# Patient Record
Sex: FEMALE | Race: WHITE | Employment: OTHER | URBAN - METROPOLITAN AREA
[De-identification: names, ages, dates, MRNs, and addresses within clinical notes are randomized per-mention and may not be internally consistent; named-entity substitution may affect disease eponyms.]

---

## 2024-03-06 RX ORDER — ALBUTEROL SULFATE 90 UG/1
2 AEROSOL, METERED RESPIRATORY (INHALATION) EVERY 4 HOURS PRN
COMMUNITY

## 2024-03-06 RX ORDER — AMOXICILLIN 500 MG/1
4 CAPSULE ORAL ONCE
COMMUNITY

## 2024-03-06 RX ORDER — MULTIVIT WITH MINERALS/LUTEIN
1000 TABLET ORAL DAILY
COMMUNITY

## 2024-03-06 RX ORDER — MULTIVITAMIN
1 TABLET ORAL DAILY
COMMUNITY

## 2024-03-06 RX ORDER — ASPIRIN 81 MG/1
81 TABLET, CHEWABLE ORAL DAILY
Status: ON HOLD | COMMUNITY
End: 2024-03-08

## 2024-03-06 RX ORDER — CYANOCOBALAMIN 1000 UG/ML
1 INJECTION, SOLUTION INTRAMUSCULAR; SUBCUTANEOUS
COMMUNITY

## 2024-03-06 RX ORDER — FLUOXETINE 40 MG/1
40 CAPSULE ORAL EVERY MORNING
COMMUNITY

## 2024-03-06 RX ORDER — BACLOFEN 10 MG/1
10 TABLET ORAL 2 TIMES DAILY
COMMUNITY

## 2024-03-06 RX ORDER — ATORVASTATIN CALCIUM 40 MG/1
40 TABLET, FILM COATED ORAL DAILY
COMMUNITY

## 2024-03-06 RX ORDER — NAPROXEN 500 MG/1
250 TABLET ORAL 2 TIMES DAILY WITH MEALS
COMMUNITY

## 2024-03-06 RX ORDER — CETIRIZINE HYDROCHLORIDE 10 MG/1
10 TABLET ORAL DAILY
COMMUNITY

## 2024-03-06 RX ORDER — DIPHENHYDRAMINE HCL 25 MG
25 CAPSULE ORAL
COMMUNITY

## 2024-03-06 RX ORDER — NITROGLYCERIN 0.4 MG/1
0.4 TABLET SUBLINGUAL EVERY 5 MIN PRN
COMMUNITY

## 2024-03-06 RX ORDER — CYCLOBENZAPRINE HCL 10 MG
10 TABLET ORAL
COMMUNITY

## 2024-03-06 RX ORDER — MONTELUKAST SODIUM 10 MG/1
10 TABLET ORAL AT BEDTIME
COMMUNITY

## 2024-03-06 RX ORDER — FERROUS SULFATE 324(65)MG
1 TABLET, DELAYED RELEASE (ENTERIC COATED) ORAL DAILY
COMMUNITY

## 2024-03-06 RX ORDER — HYDROCHLOROTHIAZIDE 25 MG/1
25 TABLET ORAL DAILY
COMMUNITY

## 2024-03-06 RX ORDER — AZELASTINE 1 MG/ML
1 SPRAY, METERED NASAL 2 TIMES DAILY
COMMUNITY

## 2024-03-06 RX ORDER — VITAMIN B COMPLEX
1 TABLET ORAL DAILY
COMMUNITY

## 2024-03-06 RX ORDER — EZETIMIBE 10 MG/1
10 TABLET ORAL DAILY
COMMUNITY

## 2024-03-06 RX ORDER — ACETAMINOPHEN 500 MG
500 TABLET ORAL AT BEDTIME
COMMUNITY

## 2024-03-06 RX ORDER — LOPERAMIDE HCL 2 MG
2 CAPSULE ORAL 4 TIMES DAILY PRN
COMMUNITY

## 2024-03-06 RX ORDER — OXYMETAZOLINE HYDROCHLORIDE 0.05 G/100ML
2 SPRAY NASAL 2 TIMES DAILY
COMMUNITY

## 2024-03-06 RX ORDER — TRAMADOL HYDROCHLORIDE 50 MG/1
50 TABLET ORAL EVERY 6 HOURS PRN
Status: ON HOLD | COMMUNITY
End: 2024-03-08

## 2024-03-06 RX ORDER — CALCIUM POLYCARBOPHIL 625 MG 625 MG/1
2 TABLET ORAL
COMMUNITY

## 2024-03-06 RX ORDER — OMEPRAZOLE 40 MG/1
40 CAPSULE, DELAYED RELEASE ORAL DAILY
COMMUNITY

## 2024-03-06 NOTE — PROGRESS NOTES
PTA medications updated by Medication Scribe prior to surgery via phone call with patient (last doses completed by Nurse)     Medication history sources: Patient and H&P  In the past week, patient estimated taking medication this percent of the time: Greater than 90%      Significant changes made to the medication list:  Patient reports no longer taking the following meds (med scribe removed from PTA med list): nasacort      Additional medication history information:   Patient was advised to bring: ventolin,astelin,nitrostat ,afrin    Medication reconciliation completed by provider prior to medication history? No    Time spent in this activity: 1.5 hours    The information provided in this note is only as accurate as the sources available at the time of update(s)      Prior to Admission medications    Medication Sig Last Dose Taking? Auth Provider Long Term End Date   acetaminophen (TYLENOL) 500 MG tablet Take 500 mg by mouth at bedtime Max dose 4000mg in 24 hours 03/07/2024 at am Yes Reported, Patient     albuterol (PROAIR HFA/PROVENTIL HFA/VENTOLIN HFA) 108 (90 Base) MCG/ACT inhaler Inhale 2 puffs into the lungs every 4 hours as needed for shortness of breath, wheezing or cough prn Yes Reported, Patient Yes    amoxicillin (AMOXIL) 500 MG capsule Take 4 capsules by mouth once Once 1 hour before dental procedure. prn Yes Reported, Patient     aspirin (ASA) 81 MG chewable tablet Take 81 mg by mouth daily 02/28/2024 at pm Yes Reported, Patient     atorvastatin (LIPITOR) 40 MG tablet Take 40 mg by mouth daily 03/06/2024 at pm Yes Reported, Patient Yes    azelastine (ASTELIN) 0.1 % nasal spray Spray 1 spray into both nostrils 2 times daily 03/07/2024 at am Yes Reported, Patient     baclofen (LIORESAL) 10 MG tablet Take 10 mg by mouth 2 times daily Prn muscle spasms 03/06/2024 at pm Yes Reported, Patient     calcium carbonate (OS-CARL) 1500 (600 Ca) MG tablet Take 1 tablet by mouth daily 02/28/2024 at pm Yes Reported,  Patient     calcium polycarbophil (FIBERCON) 625 MG tablet Take 2 tablets by mouth 02/28/2024 at pm Yes Reported, Patient     cetirizine (ZYRTEC) 10 MG tablet Take 10 mg by mouth daily 03/06/2024 at am Yes Reported, Patient     cyanocobalamin (CYANOCOBALAMIN) 1000 MCG/ML injection Inject 1 mL into the muscle every 30 days 03/06/2024 at pm Yes Reported, Patient     cyclobenzaprine (FLEXERIL) 10 MG tablet Take 10 mg by mouth nightly as needed for muscle spasms 03/06/2024 at pm Yes Reported, Patient     diphenhydrAMINE (BENADRYL) 25 MG capsule Take 25 mg by mouth nightly as needed for itching or allergies 03/06/2024 at pm Yes Reported, Patient     ezetimibe (ZETIA) 10 MG tablet Take 10 mg by mouth daily 03/06/2024 at pm Yes Reported, Patient Yes    Ferrous Sulfate 324 (65 Fe) MG TBEC Take 1 tablet by mouth daily 02/28//2024 at pm Yes Reported, Patient     FLUoxetine (PROZAC) 40 MG capsule Take 40 mg by mouth every morning 03/06/2024 at am Yes Reported, Patient Yes    hydrochlorothiazide (HYDRODIURIL) 25 MG tablet Take 25 mg by mouth daily prn Yes Reported, Patient Yes    loperamide (IMODIUM) 2 MG capsule Take 2 mg by mouth 4 times daily as needed for diarrhea 03/07/2024 at am Yes Reported, Patient     montelukast (SINGULAIR) 10 MG tablet Take 10 mg by mouth at bedtime 03/06/2024 at pm Yes Reported, Patient Yes    multiple vitamin  tablet TABS Take 1 tablet by mouth daily 02/28/2024 at pm Yes Reported, Patient     naproxen (NAPROSYN) 500 MG tablet Take 250 mg by mouth 2 times daily (with meals) 0.5 X 500mg = 250mg 02/28/2024 at pm Yes Reported, Patient     nitroGLYcerin (NITROSTAT) 0.4 MG sublingual tablet Place 0.4 mg under the tongue every 5 minutes as needed for chest pain For chest pain place 1 tablet under the tongue every 5 minutes for 3 doses. If symptoms persist 5 minutes after 1st dose call 911. prn Yes Reported, Patient Yes    NONFORMULARY Protein powder   minx in liquid daily 03/05/2024 at am Yes Reported,  Patient     omeprazole (PRILOSEC) 40 MG DR capsule Take 40 mg by mouth daily 03/07/2024 at am Yes Reported, Patient     oxymetazoline (AFRIN) 0.05 % nasal spray Spray 2 sprays into both nostrils 2 times daily prn Yes Reported, Patient     selenium 50 MCG TABS tablet Take 50 mcg by mouth daily 02/28/2024 at pm Yes Reported, Patient     traMADol (ULTRAM) 50 MG tablet Take 50 mg by mouth every 6 hours as needed for severe pain 03/04/2024 at pm Yes Reported, Patient     vitamin B complex with vitamin C (VITAMIN  B COMPLEX) tablet Take 1 tablet by mouth daily 02/28/2024 at pm Yes Reported, Patient     vitamin C (ASCORBIC ACID) 1000 MG TABS Take 1,000 mg by mouth daily 02/28/2024 at pm Yes Reported, Patient     Vitamin D3 (VITAMIN D, CHOLECALCIFEROL,) 25 mcg (1000 units) tablet Take 1 tablet by mouth daily 02/28/2024 at pm Yes Reported, Patient         Medication history completed by: Makayla Garza

## 2024-03-07 ENCOUNTER — HOSPITAL ENCOUNTER (INPATIENT)
Facility: CLINIC | Age: 79
LOS: 2 days | Discharge: HOME | DRG: 467 | End: 2024-03-09
Attending: ORTHOPAEDIC SURGERY | Admitting: ORTHOPAEDIC SURGERY
Payer: COMMERCIAL

## 2024-03-07 ENCOUNTER — APPOINTMENT (OUTPATIENT)
Dept: PHYSICAL THERAPY | Facility: CLINIC | Age: 79
DRG: 467 | End: 2024-03-07
Attending: ORTHOPAEDIC SURGERY
Payer: COMMERCIAL

## 2024-03-07 ENCOUNTER — ANESTHESIA EVENT (OUTPATIENT)
Dept: SURGERY | Facility: CLINIC | Age: 79
DRG: 467 | End: 2024-03-07
Payer: COMMERCIAL

## 2024-03-07 ENCOUNTER — ANESTHESIA (OUTPATIENT)
Dept: SURGERY | Facility: CLINIC | Age: 79
DRG: 467 | End: 2024-03-07
Payer: COMMERCIAL

## 2024-03-07 ENCOUNTER — APPOINTMENT (OUTPATIENT)
Dept: GENERAL RADIOLOGY | Facility: CLINIC | Age: 79
DRG: 467 | End: 2024-03-07
Attending: ORTHOPAEDIC SURGERY
Payer: COMMERCIAL

## 2024-03-07 DIAGNOSIS — Z87.828 HEALED WOUND: ICD-10-CM

## 2024-03-07 DIAGNOSIS — Z96.652 S/P REVISION OF TOTAL KNEE, LEFT: ICD-10-CM

## 2024-03-07 DIAGNOSIS — Z96.652 STATUS POST REVISION OF TOTAL KNEE REPLACEMENT, LEFT: Primary | ICD-10-CM

## 2024-03-07 PROBLEM — E66.813 OBESITY, CLASS III, BMI 40-49.9 (MORBID OBESITY) (H): Status: ACTIVE | Noted: 2017-02-15

## 2024-03-07 PROBLEM — M47.816 OSTEOARTHRITIS OF LUMBAR SPINE: Status: ACTIVE | Noted: 2017-11-20

## 2024-03-07 PROBLEM — E55.9 VITAMIN D DEFICIENCY: Status: ACTIVE | Noted: 2017-08-22

## 2024-03-07 PROBLEM — E78.5 HYPERLIPIDEMIA, UNSPECIFIED: Status: ACTIVE | Noted: 2017-02-16

## 2024-03-07 PROBLEM — F32.1 DEPRESSION, MAJOR, SINGLE EPISODE, MODERATE (H): Status: ACTIVE | Noted: 2024-02-14

## 2024-03-07 PROBLEM — F32.0 DEPRESSION, MAJOR, SINGLE EPISODE, MILD (H): Status: ACTIVE | Noted: 2021-06-01

## 2024-03-07 PROBLEM — G47.33 OBSTRUCTIVE SLEEP APNEA: Status: ACTIVE | Noted: 2019-01-04

## 2024-03-07 PROBLEM — Z98.84 HISTORY OF BARIATRIC SURGERY: Status: ACTIVE | Noted: 2024-02-14

## 2024-03-07 PROBLEM — E66.01 OBESITY, CLASS III, BMI 40-49.9 (MORBID OBESITY) (H): Status: ACTIVE | Noted: 2017-02-15

## 2024-03-07 PROBLEM — C44.90 MALIGNANT NEOPLASM OF SKIN: Status: ACTIVE | Noted: 2021-10-13

## 2024-03-07 PROBLEM — E03.9 HYPOTHYROIDISM: Status: ACTIVE | Noted: 2017-08-22

## 2024-03-07 PROBLEM — C44.301 SKIN CANCER OF NOSE: Status: ACTIVE | Noted: 2022-03-08

## 2024-03-07 LAB
ABO/RH(D): NORMAL
ANTIBODY SCREEN: NEGATIVE
CREAT SERPL-MCNC: 0.77 MG/DL (ref 0.51–0.95)
EGFRCR SERPLBLD CKD-EPI 2021: 79 ML/MIN/1.73M2
GLUCOSE SERPL-MCNC: 98 MG/DL (ref 70–99)
GRAM STAIN RESULT: NORMAL
GRAM STAIN RESULT: NORMAL
HGB BLD-MCNC: 11.1 G/DL (ref 11.7–15.7)
POTASSIUM SERPL-SCNC: 4.5 MMOL/L (ref 3.4–5.3)
SPECIMEN EXPIRATION DATE: NORMAL

## 2024-03-07 PROCEDURE — 271N000001 HC OR GENERAL SUPPLY NON-STERILE: Performed by: ORTHOPAEDIC SURGERY

## 2024-03-07 PROCEDURE — 370N000017 HC ANESTHESIA TECHNICAL FEE, PER MIN: Performed by: ORTHOPAEDIC SURGERY

## 2024-03-07 PROCEDURE — 250N000013 HC RX MED GY IP 250 OP 250 PS 637: Performed by: ORTHOPAEDIC SURGERY

## 2024-03-07 PROCEDURE — 250N000011 HC RX IP 250 OP 636: Performed by: PHYSICIAN ASSISTANT

## 2024-03-07 PROCEDURE — 250N000011 HC RX IP 250 OP 636: Performed by: ORTHOPAEDIC SURGERY

## 2024-03-07 PROCEDURE — 120N000001 HC R&B MED SURG/OB

## 2024-03-07 PROCEDURE — 258N000003 HC RX IP 258 OP 636: Performed by: ANESTHESIOLOGY

## 2024-03-07 PROCEDURE — C1713 ANCHOR/SCREW BN/BN,TIS/BN: HCPCS | Performed by: ORTHOPAEDIC SURGERY

## 2024-03-07 PROCEDURE — 250N000025 HC SEVOFLURANE, PER MIN: Performed by: ORTHOPAEDIC SURGERY

## 2024-03-07 PROCEDURE — 250N000011 HC RX IP 250 OP 636: Performed by: ANESTHESIOLOGY

## 2024-03-07 PROCEDURE — 27486 REVISE/REPLACE KNEE JOINT: CPT | Performed by: NURSE ANESTHETIST, CERTIFIED REGISTERED

## 2024-03-07 PROCEDURE — 36415 COLL VENOUS BLD VENIPUNCTURE: CPT | Performed by: ANESTHESIOLOGY

## 2024-03-07 PROCEDURE — 710N000009 HC RECOVERY PHASE 1, LEVEL 1, PER MIN: Performed by: ORTHOPAEDIC SURGERY

## 2024-03-07 PROCEDURE — 272N000001 HC OR GENERAL SUPPLY STERILE: Performed by: ORTHOPAEDIC SURGERY

## 2024-03-07 PROCEDURE — 97161 PT EVAL LOW COMPLEX 20 MIN: CPT | Mod: GP

## 2024-03-07 PROCEDURE — 258N000003 HC RX IP 258 OP 636: Performed by: ORTHOPAEDIC SURGERY

## 2024-03-07 PROCEDURE — 250N000011 HC RX IP 250 OP 636: Performed by: NURSE ANESTHETIST, CERTIFIED REGISTERED

## 2024-03-07 PROCEDURE — 258N000003 HC RX IP 258 OP 636: Performed by: NURSE ANESTHETIST, CERTIFIED REGISTERED

## 2024-03-07 PROCEDURE — 87205 SMEAR GRAM STAIN: CPT | Performed by: ORTHOPAEDIC SURGERY

## 2024-03-07 PROCEDURE — 82565 ASSAY OF CREATININE: CPT | Performed by: ANESTHESIOLOGY

## 2024-03-07 PROCEDURE — 250N000011 HC RX IP 250 OP 636: Mod: JZ | Performed by: NURSE ANESTHETIST, CERTIFIED REGISTERED

## 2024-03-07 PROCEDURE — 82947 ASSAY GLUCOSE BLOOD QUANT: CPT | Performed by: ANESTHESIOLOGY

## 2024-03-07 PROCEDURE — 250N000009 HC RX 250: Performed by: NURSE ANESTHETIST, CERTIFIED REGISTERED

## 2024-03-07 PROCEDURE — 99100 ANES PT EXTEME AGE<1 YR&>70: CPT | Performed by: NURSE ANESTHETIST, CERTIFIED REGISTERED

## 2024-03-07 PROCEDURE — C1762 CONN TISS, HUMAN(INC FASCIA): HCPCS | Performed by: ORTHOPAEDIC SURGERY

## 2024-03-07 PROCEDURE — 250N000013 HC RX MED GY IP 250 OP 250 PS 637: Performed by: PHYSICIAN ASSISTANT

## 2024-03-07 PROCEDURE — 999N000063 XR KNEE PORT LEFT 1/2 VIEWS: Mod: LT

## 2024-03-07 PROCEDURE — 999N000141 HC STATISTIC PRE-PROCEDURE NURSING ASSESSMENT: Performed by: ORTHOPAEDIC SURGERY

## 2024-03-07 PROCEDURE — 27486 REVISE/REPLACE KNEE JOINT: CPT | Performed by: ANESTHESIOLOGY

## 2024-03-07 PROCEDURE — 84132 ASSAY OF SERUM POTASSIUM: CPT | Performed by: ANESTHESIOLOGY

## 2024-03-07 PROCEDURE — 86900 BLOOD TYPING SEROLOGIC ABO: CPT | Performed by: ORTHOPAEDIC SURGERY

## 2024-03-07 PROCEDURE — 87070 CULTURE OTHR SPECIMN AEROBIC: CPT | Performed by: ORTHOPAEDIC SURGERY

## 2024-03-07 PROCEDURE — 360N000078 HC SURGERY LEVEL 5, PER MIN: Performed by: ORTHOPAEDIC SURGERY

## 2024-03-07 PROCEDURE — 250N000009 HC RX 250: Performed by: ORTHOPAEDIC SURGERY

## 2024-03-07 PROCEDURE — P9045 ALBUMIN (HUMAN), 5%, 250 ML: HCPCS | Mod: JZ | Performed by: NURSE ANESTHETIST, CERTIFIED REGISTERED

## 2024-03-07 PROCEDURE — C1776 JOINT DEVICE (IMPLANTABLE): HCPCS | Performed by: ORTHOPAEDIC SURGERY

## 2024-03-07 PROCEDURE — 97110 THERAPEUTIC EXERCISES: CPT | Mod: GP

## 2024-03-07 PROCEDURE — 85018 HEMOGLOBIN: CPT | Performed by: ORTHOPAEDIC SURGERY

## 2024-03-07 PROCEDURE — 87076 CULTURE ANAEROBE IDENT EACH: CPT | Performed by: ORTHOPAEDIC SURGERY

## 2024-03-07 PROCEDURE — 258N000003 HC RX IP 258 OP 636: Performed by: PHYSICIAN ASSISTANT

## 2024-03-07 DEVICE — LPS DISTAL FEMORAL COMPONENT X-SMALL LEFT
Type: IMPLANTABLE DEVICE | Site: KNEE | Status: FUNCTIONAL
Brand: LPS

## 2024-03-07 DEVICE — BONE CEMENT SIMPLEX W/TOBRAMYCIN 6197-9-001: Type: IMPLANTABLE DEVICE | Site: KNEE | Status: FUNCTIONAL

## 2024-03-07 DEVICE — GRAFT BONE STRUT CORTICAL 10CMX20MM 400610: Type: IMPLANTABLE DEVICE | Site: KNEE | Status: FUNCTIONAL

## 2024-03-07 DEVICE — IMP CABLE SYN ORTHO COCR W/CRIMP 1.7X750MM 611.105.01S: Type: IMPLANTABLE DEVICE | Site: KNEE | Status: FUNCTIONAL

## 2024-03-07 DEVICE — DBM T44150 10CC ORTHOBLEND SMALL DEFGRAF
Type: IMPLANTABLE DEVICE | Site: KNEE | Status: FUNCTIONAL
Brand: GRAFTON®AND GRAFTON PLUS®DEMINERALIZED BONE MATRIX (DBM)

## 2024-03-07 DEVICE — BUCK FEMORAL CEMENT RESTRICTOR 18.5MM
Type: IMPLANTABLE DEVICE | Site: KNEE | Status: FUNCTIONAL
Brand: BUCK

## 2024-03-07 DEVICE — ATTUNE KNEE SYSTEM REVISION TIBIAL SLEEVE POROCOAT PARTIALLY COATED 45MM
Type: IMPLANTABLE DEVICE | Site: KNEE | Status: FUNCTIONAL
Brand: ATTUNE

## 2024-03-07 DEVICE — ATTUNE KNEE SYSTEM REVISION ROTATING PLATFORM TIBIAL BASE CEMENTED SIZE 6
Type: IMPLANTABLE DEVICE | Site: KNEE | Status: FUNCTIONAL
Brand: ATTUNE

## 2024-03-07 DEVICE — ATTUNE KNEE SYSTEM REVISION PRESSFIT STEM 20X60MM
Type: IMPLANTABLE DEVICE | Site: KNEE | Status: FUNCTIONAL
Brand: ATTUNE

## 2024-03-07 RX ORDER — LABETALOL HYDROCHLORIDE 5 MG/ML
5 INJECTION, SOLUTION INTRAVENOUS
Status: DISCONTINUED | OUTPATIENT
Start: 2024-03-07 | End: 2024-03-07 | Stop reason: HOSPADM

## 2024-03-07 RX ORDER — ALBUTEROL SULFATE 90 UG/1
2 AEROSOL, METERED RESPIRATORY (INHALATION) EVERY 4 HOURS PRN
Status: DISCONTINUED | OUTPATIENT
Start: 2024-03-07 | End: 2024-03-09 | Stop reason: HOSPADM

## 2024-03-07 RX ORDER — SCOLOPAMINE TRANSDERMAL SYSTEM 1 MG/1
1 PATCH, EXTENDED RELEASE TRANSDERMAL ONCE
Status: COMPLETED | OUTPATIENT
Start: 2024-03-07 | End: 2024-03-08

## 2024-03-07 RX ORDER — AZELASTINE 1 MG/ML
1 SPRAY, METERED NASAL 2 TIMES DAILY
Status: DISCONTINUED | OUTPATIENT
Start: 2024-03-07 | End: 2024-03-09 | Stop reason: HOSPADM

## 2024-03-07 RX ORDER — MONTELUKAST SODIUM 10 MG/1
10 TABLET ORAL AT BEDTIME
Status: DISCONTINUED | OUTPATIENT
Start: 2024-03-07 | End: 2024-03-09 | Stop reason: HOSPADM

## 2024-03-07 RX ORDER — HYDROCHLOROTHIAZIDE 25 MG/1
25 TABLET ORAL DAILY PRN
Status: DISCONTINUED | OUTPATIENT
Start: 2024-03-07 | End: 2024-03-09 | Stop reason: HOSPADM

## 2024-03-07 RX ORDER — OXYMETAZOLINE HYDROCHLORIDE 0.05 G/100ML
2 SPRAY NASAL 2 TIMES DAILY PRN
Status: DISCONTINUED | OUTPATIENT
Start: 2024-03-07 | End: 2024-03-09 | Stop reason: HOSPADM

## 2024-03-07 RX ORDER — VANCOMYCIN HYDROCHLORIDE 1 G/20ML
INJECTION, POWDER, LYOPHILIZED, FOR SOLUTION INTRAVENOUS PRN
Status: DISCONTINUED | OUTPATIENT
Start: 2024-03-07 | End: 2024-03-07 | Stop reason: HOSPADM

## 2024-03-07 RX ORDER — NALOXONE HYDROCHLORIDE 0.4 MG/ML
0.4 INJECTION, SOLUTION INTRAMUSCULAR; INTRAVENOUS; SUBCUTANEOUS
Status: DISCONTINUED | OUTPATIENT
Start: 2024-03-07 | End: 2024-03-09 | Stop reason: HOSPADM

## 2024-03-07 RX ORDER — HYDROXYZINE HYDROCHLORIDE 10 MG/1
10 TABLET, FILM COATED ORAL EVERY 6 HOURS PRN
Status: DISCONTINUED | OUTPATIENT
Start: 2024-03-07 | End: 2024-03-09 | Stop reason: HOSPADM

## 2024-03-07 RX ORDER — CEFAZOLIN SODIUM/WATER 2 G/20 ML
2 SYRINGE (ML) INTRAVENOUS
Status: COMPLETED | OUTPATIENT
Start: 2024-03-07 | End: 2024-03-07

## 2024-03-07 RX ORDER — ASCORBIC ACID 500 MG
1000 TABLET ORAL EVERY EVENING
Status: DISCONTINUED | OUTPATIENT
Start: 2024-03-07 | End: 2024-03-09 | Stop reason: HOSPADM

## 2024-03-07 RX ORDER — HYDRALAZINE HYDROCHLORIDE 20 MG/ML
2.5-5 INJECTION INTRAMUSCULAR; INTRAVENOUS
Status: DISCONTINUED | OUTPATIENT
Start: 2024-03-07 | End: 2024-03-07 | Stop reason: HOSPADM

## 2024-03-07 RX ORDER — FENTANYL CITRATE 50 UG/ML
50 INJECTION, SOLUTION INTRAMUSCULAR; INTRAVENOUS EVERY 5 MIN PRN
Status: DISCONTINUED | OUTPATIENT
Start: 2024-03-07 | End: 2024-03-07 | Stop reason: HOSPADM

## 2024-03-07 RX ORDER — FERROUS SULFATE 325(65) MG
324 TABLET ORAL EVERY EVENING
Status: DISCONTINUED | OUTPATIENT
Start: 2024-03-07 | End: 2024-03-07

## 2024-03-07 RX ORDER — TRAMADOL HYDROCHLORIDE 50 MG/1
50 TABLET ORAL EVERY 6 HOURS PRN
Status: DISCONTINUED | OUTPATIENT
Start: 2024-03-07 | End: 2024-03-09 | Stop reason: HOSPADM

## 2024-03-07 RX ORDER — ONDANSETRON 4 MG/1
4 TABLET, ORALLY DISINTEGRATING ORAL EVERY 30 MIN PRN
Status: DISCONTINUED | OUTPATIENT
Start: 2024-03-07 | End: 2024-03-07 | Stop reason: HOSPADM

## 2024-03-07 RX ORDER — NALOXONE HYDROCHLORIDE 0.4 MG/ML
0.2 INJECTION, SOLUTION INTRAMUSCULAR; INTRAVENOUS; SUBCUTANEOUS
Status: DISCONTINUED | OUTPATIENT
Start: 2024-03-07 | End: 2024-03-09 | Stop reason: HOSPADM

## 2024-03-07 RX ORDER — AMOXICILLIN 250 MG
1 CAPSULE ORAL 2 TIMES DAILY
Status: DISCONTINUED | OUTPATIENT
Start: 2024-03-07 | End: 2024-03-09 | Stop reason: HOSPADM

## 2024-03-07 RX ORDER — FENTANYL CITRATE 50 UG/ML
INJECTION, SOLUTION INTRAMUSCULAR; INTRAVENOUS PRN
Status: DISCONTINUED | OUTPATIENT
Start: 2024-03-07 | End: 2024-03-07

## 2024-03-07 RX ORDER — SODIUM CHLORIDE, SODIUM LACTATE, POTASSIUM CHLORIDE, CALCIUM CHLORIDE 600; 310; 30; 20 MG/100ML; MG/100ML; MG/100ML; MG/100ML
INJECTION, SOLUTION INTRAVENOUS CONTINUOUS
Status: DISCONTINUED | OUTPATIENT
Start: 2024-03-07 | End: 2024-03-07 | Stop reason: HOSPADM

## 2024-03-07 RX ORDER — MAGNESIUM HYDROXIDE 1200 MG/15ML
LIQUID ORAL PRN
Status: DISCONTINUED | OUTPATIENT
Start: 2024-03-07 | End: 2024-03-07 | Stop reason: HOSPADM

## 2024-03-07 RX ORDER — ACETAMINOPHEN 325 MG/1
975 TABLET ORAL 3 TIMES DAILY
Qty: 27 TABLET | Refills: 0 | Status: DISCONTINUED | OUTPATIENT
Start: 2024-03-07 | End: 2024-03-09 | Stop reason: HOSPADM

## 2024-03-07 RX ORDER — ONDANSETRON 2 MG/ML
4 INJECTION INTRAMUSCULAR; INTRAVENOUS EVERY 6 HOURS PRN
Status: DISCONTINUED | OUTPATIENT
Start: 2024-03-07 | End: 2024-03-09 | Stop reason: HOSPADM

## 2024-03-07 RX ORDER — LOPERAMIDE HCL 2 MG
2 CAPSULE ORAL 4 TIMES DAILY PRN
Status: DISCONTINUED | OUTPATIENT
Start: 2024-03-07 | End: 2024-03-09 | Stop reason: HOSPADM

## 2024-03-07 RX ORDER — ONDANSETRON 2 MG/ML
4 INJECTION INTRAMUSCULAR; INTRAVENOUS EVERY 30 MIN PRN
Status: DISCONTINUED | OUTPATIENT
Start: 2024-03-07 | End: 2024-03-07 | Stop reason: HOSPADM

## 2024-03-07 RX ORDER — CEFAZOLIN SODIUM/WATER 2 G/20 ML
2 SYRINGE (ML) INTRAVENOUS SEE ADMIN INSTRUCTIONS
Status: DISCONTINUED | OUTPATIENT
Start: 2024-03-07 | End: 2024-03-07 | Stop reason: HOSPADM

## 2024-03-07 RX ORDER — PANTOPRAZOLE SODIUM 40 MG/1
40 TABLET, DELAYED RELEASE ORAL DAILY
Status: DISCONTINUED | OUTPATIENT
Start: 2024-03-08 | End: 2024-03-09 | Stop reason: HOSPADM

## 2024-03-07 RX ORDER — TRANEXAMIC ACID 650 MG/1
1950 TABLET ORAL ONCE
Status: COMPLETED | OUTPATIENT
Start: 2024-03-07 | End: 2024-03-07

## 2024-03-07 RX ORDER — GABAPENTIN 100 MG/1
100 CAPSULE ORAL AT BEDTIME
Status: DISCONTINUED | OUTPATIENT
Start: 2024-03-07 | End: 2024-03-09 | Stop reason: HOSPADM

## 2024-03-07 RX ORDER — BISACODYL 10 MG
10 SUPPOSITORY, RECTAL RECTAL DAILY PRN
Status: DISCONTINUED | OUTPATIENT
Start: 2024-03-10 | End: 2024-03-09 | Stop reason: HOSPADM

## 2024-03-07 RX ORDER — VECURONIUM BROMIDE 1 MG/ML
INJECTION, POWDER, LYOPHILIZED, FOR SOLUTION INTRAVENOUS PRN
Status: DISCONTINUED | OUTPATIENT
Start: 2024-03-07 | End: 2024-03-07

## 2024-03-07 RX ORDER — SODIUM CHLORIDE, SODIUM LACTATE, POTASSIUM CHLORIDE, CALCIUM CHLORIDE 600; 310; 30; 20 MG/100ML; MG/100ML; MG/100ML; MG/100ML
INJECTION, SOLUTION INTRAVENOUS CONTINUOUS
Status: DISCONTINUED | OUTPATIENT
Start: 2024-03-07 | End: 2024-03-09 | Stop reason: HOSPADM

## 2024-03-07 RX ORDER — ONDANSETRON 4 MG/1
4 TABLET, ORALLY DISINTEGRATING ORAL EVERY 6 HOURS PRN
Status: DISCONTINUED | OUTPATIENT
Start: 2024-03-07 | End: 2024-03-09 | Stop reason: HOSPADM

## 2024-03-07 RX ORDER — NALOXONE HYDROCHLORIDE 0.4 MG/ML
0.1 INJECTION, SOLUTION INTRAMUSCULAR; INTRAVENOUS; SUBCUTANEOUS
Status: DISCONTINUED | OUTPATIENT
Start: 2024-03-07 | End: 2024-03-07 | Stop reason: HOSPADM

## 2024-03-07 RX ORDER — CYCLOBENZAPRINE HCL 10 MG
10 TABLET ORAL
Status: DISCONTINUED | OUTPATIENT
Start: 2024-03-07 | End: 2024-03-09 | Stop reason: HOSPADM

## 2024-03-07 RX ORDER — LIDOCAINE 40 MG/G
CREAM TOPICAL
Status: DISCONTINUED | OUTPATIENT
Start: 2024-03-07 | End: 2024-03-09 | Stop reason: HOSPADM

## 2024-03-07 RX ORDER — ATORVASTATIN CALCIUM 40 MG/1
40 TABLET, FILM COATED ORAL EVERY EVENING
Status: DISCONTINUED | OUTPATIENT
Start: 2024-03-07 | End: 2024-03-09 | Stop reason: HOSPADM

## 2024-03-07 RX ORDER — PROCHLORPERAZINE MALEATE 5 MG
5 TABLET ORAL EVERY 6 HOURS PRN
Status: DISCONTINUED | OUTPATIENT
Start: 2024-03-07 | End: 2024-03-09 | Stop reason: HOSPADM

## 2024-03-07 RX ORDER — PROPOFOL 10 MG/ML
INJECTION, EMULSION INTRAVENOUS CONTINUOUS PRN
Status: DISCONTINUED | OUTPATIENT
Start: 2024-03-07 | End: 2024-03-07

## 2024-03-07 RX ORDER — POLYETHYLENE GLYCOL 3350 17 G/17G
17 POWDER, FOR SOLUTION ORAL DAILY
Status: DISCONTINUED | OUTPATIENT
Start: 2024-03-08 | End: 2024-03-09 | Stop reason: HOSPADM

## 2024-03-07 RX ORDER — CETIRIZINE HYDROCHLORIDE 10 MG/1
10 TABLET ORAL DAILY
Status: DISCONTINUED | OUTPATIENT
Start: 2024-03-08 | End: 2024-03-09 | Stop reason: HOSPADM

## 2024-03-07 RX ORDER — KETAMINE HYDROCHLORIDE 10 MG/ML
INJECTION INTRAMUSCULAR; INTRAVENOUS PRN
Status: DISCONTINUED | OUTPATIENT
Start: 2024-03-07 | End: 2024-03-07

## 2024-03-07 RX ORDER — CALCIUM POLYCARBOPHIL 625 MG 625 MG/1
1250 TABLET ORAL EVERY EVENING
Status: DISCONTINUED | OUTPATIENT
Start: 2024-03-07 | End: 2024-03-09 | Stop reason: HOSPADM

## 2024-03-07 RX ORDER — DEXAMETHASONE SODIUM PHOSPHATE 4 MG/ML
INJECTION, SOLUTION INTRA-ARTICULAR; INTRALESIONAL; INTRAMUSCULAR; INTRAVENOUS; SOFT TISSUE PRN
Status: DISCONTINUED | OUTPATIENT
Start: 2024-03-07 | End: 2024-03-07

## 2024-03-07 RX ORDER — PROPOFOL 10 MG/ML
INJECTION, EMULSION INTRAVENOUS PRN
Status: DISCONTINUED | OUTPATIENT
Start: 2024-03-07 | End: 2024-03-07

## 2024-03-07 RX ORDER — LIDOCAINE HYDROCHLORIDE 20 MG/ML
INJECTION, SOLUTION INFILTRATION; PERINEURAL PRN
Status: DISCONTINUED | OUTPATIENT
Start: 2024-03-07 | End: 2024-03-07

## 2024-03-07 RX ORDER — BACLOFEN 10 MG/1
10 TABLET ORAL 2 TIMES DAILY
Status: DISCONTINUED | OUTPATIENT
Start: 2024-03-07 | End: 2024-03-09 | Stop reason: HOSPADM

## 2024-03-07 RX ORDER — EZETIMIBE 10 MG/1
10 TABLET ORAL EVERY EVENING
Status: DISCONTINUED | OUTPATIENT
Start: 2024-03-07 | End: 2024-03-09 | Stop reason: HOSPADM

## 2024-03-07 RX ORDER — DIPHENHYDRAMINE HCL 25 MG
25 CAPSULE ORAL
Status: DISCONTINUED | OUTPATIENT
Start: 2024-03-07 | End: 2024-03-09 | Stop reason: HOSPADM

## 2024-03-07 RX ORDER — ONDANSETRON 2 MG/ML
INJECTION INTRAMUSCULAR; INTRAVENOUS PRN
Status: DISCONTINUED | OUTPATIENT
Start: 2024-03-07 | End: 2024-03-07

## 2024-03-07 RX ORDER — ACETAMINOPHEN 325 MG/1
650 TABLET ORAL EVERY 4 HOURS PRN
Status: DISCONTINUED | OUTPATIENT
Start: 2024-03-10 | End: 2024-03-09 | Stop reason: HOSPADM

## 2024-03-07 RX ORDER — NITROGLYCERIN 0.4 MG/1
0.4 TABLET SUBLINGUAL EVERY 5 MIN PRN
Status: DISCONTINUED | OUTPATIENT
Start: 2024-03-07 | End: 2024-03-09 | Stop reason: HOSPADM

## 2024-03-07 RX ORDER — VITAMIN B COMPLEX
25 TABLET ORAL EVERY EVENING
Status: DISCONTINUED | OUTPATIENT
Start: 2024-03-07 | End: 2024-03-09 | Stop reason: HOSPADM

## 2024-03-07 RX ORDER — FENTANYL CITRATE 50 UG/ML
25 INJECTION, SOLUTION INTRAMUSCULAR; INTRAVENOUS EVERY 5 MIN PRN
Status: DISCONTINUED | OUTPATIENT
Start: 2024-03-07 | End: 2024-03-07 | Stop reason: HOSPADM

## 2024-03-07 RX ORDER — FERROUS SULFATE 325(65) MG
325 TABLET ORAL EVERY EVENING
Status: DISCONTINUED | OUTPATIENT
Start: 2024-03-07 | End: 2024-03-09 | Stop reason: HOSPADM

## 2024-03-07 RX ADMIN — DEXMEDETOMIDINE HYDROCHLORIDE 4 MCG: 100 INJECTION, SOLUTION INTRAVENOUS at 12:08

## 2024-03-07 RX ADMIN — OXYCODONE HYDROCHLORIDE AND ACETAMINOPHEN 1000 MG: 500 TABLET ORAL at 20:29

## 2024-03-07 RX ADMIN — DOCUSATE SODIUM 50 MG AND SENNOSIDES 8.6 MG 1 TABLET: 8.6; 5 TABLET, FILM COATED ORAL at 20:30

## 2024-03-07 RX ADMIN — B-COMPLEX W/ C & FOLIC ACID TAB 1 TABLET: TAB at 20:28

## 2024-03-07 RX ADMIN — Medication 10 MG: at 14:49

## 2024-03-07 RX ADMIN — Medication 20 MG: at 13:22

## 2024-03-07 RX ADMIN — ACETAMINOPHEN 975 MG: 325 TABLET, FILM COATED ORAL at 18:33

## 2024-03-07 RX ADMIN — Medication 2 G: at 09:59

## 2024-03-07 RX ADMIN — ALBUMIN HUMAN: 0.05 INJECTION, SOLUTION INTRAVENOUS at 14:12

## 2024-03-07 RX ADMIN — DEXMEDETOMIDINE HYDROCHLORIDE 20 MCG: 100 INJECTION, SOLUTION INTRAVENOUS at 15:36

## 2024-03-07 RX ADMIN — SUGAMMADEX 300 MG: 100 INJECTION, SOLUTION INTRAVENOUS at 16:04

## 2024-03-07 RX ADMIN — VECURONIUM BROMIDE 2 MG: 1 INJECTION, POWDER, LYOPHILIZED, FOR SOLUTION INTRAVENOUS at 13:50

## 2024-03-07 RX ADMIN — VECURONIUM BROMIDE 2 MG: 1 INJECTION, POWDER, LYOPHILIZED, FOR SOLUTION INTRAVENOUS at 12:23

## 2024-03-07 RX ADMIN — FENTANYL CITRATE 50 MCG: 50 INJECTION, SOLUTION INTRAMUSCULAR; INTRAVENOUS at 17:27

## 2024-03-07 RX ADMIN — LIDOCAINE HYDROCHLORIDE 100 MG: 20 INJECTION, SOLUTION INFILTRATION; PERINEURAL at 09:55

## 2024-03-07 RX ADMIN — Medication 2 G: at 12:27

## 2024-03-07 RX ADMIN — MONTELUKAST 10 MG: 10 TABLET, FILM COATED ORAL at 22:22

## 2024-03-07 RX ADMIN — VECURONIUM BROMIDE 2 MG: 1 INJECTION, POWDER, LYOPHILIZED, FOR SOLUTION INTRAVENOUS at 13:21

## 2024-03-07 RX ADMIN — ATORVASTATIN CALCIUM 40 MG: 40 TABLET, FILM COATED ORAL at 20:39

## 2024-03-07 RX ADMIN — MIDAZOLAM 1 MG: 1 INJECTION INTRAMUSCULAR; INTRAVENOUS at 09:01

## 2024-03-07 RX ADMIN — FENTANYL CITRATE 50 MCG: 50 INJECTION INTRAMUSCULAR; INTRAVENOUS at 11:47

## 2024-03-07 RX ADMIN — VECURONIUM BROMIDE 2 MG: 1 INJECTION, POWDER, LYOPHILIZED, FOR SOLUTION INTRAVENOUS at 14:17

## 2024-03-07 RX ADMIN — ONDANSETRON 4 MG: 2 INJECTION INTRAMUSCULAR; INTRAVENOUS at 10:20

## 2024-03-07 RX ADMIN — FENTANYL CITRATE 50 MCG: 50 INJECTION INTRAMUSCULAR; INTRAVENOUS at 10:45

## 2024-03-07 RX ADMIN — ACETAMINOPHEN 975 MG: 325 TABLET, FILM COATED ORAL at 22:21

## 2024-03-07 RX ADMIN — ALBUMIN HUMAN: 0.05 INJECTION, SOLUTION INTRAVENOUS at 11:43

## 2024-03-07 RX ADMIN — SODIUM CHLORIDE, POTASSIUM CHLORIDE, SODIUM LACTATE AND CALCIUM CHLORIDE: 600; 310; 30; 20 INJECTION, SOLUTION INTRAVENOUS at 08:15

## 2024-03-07 RX ADMIN — FENTANYL CITRATE 50 MCG: 50 INJECTION INTRAMUSCULAR; INTRAVENOUS at 09:58

## 2024-03-07 RX ADMIN — SODIUM CHLORIDE, POTASSIUM CHLORIDE, SODIUM LACTATE AND CALCIUM CHLORIDE: 600; 310; 30; 20 INJECTION, SOLUTION INTRAVENOUS at 14:44

## 2024-03-07 RX ADMIN — HYDROXYZINE HYDROCHLORIDE 10 MG: 10 TABLET, FILM COATED ORAL at 20:38

## 2024-03-07 RX ADMIN — FENTANYL CITRATE 50 MCG: 50 INJECTION, SOLUTION INTRAMUSCULAR; INTRAVENOUS at 17:16

## 2024-03-07 RX ADMIN — SCOPALAMINE 1 PATCH: 1 PATCH, EXTENDED RELEASE TRANSDERMAL at 09:18

## 2024-03-07 RX ADMIN — FENTANYL CITRATE 25 MCG: 50 INJECTION INTRAMUSCULAR; INTRAVENOUS at 11:35

## 2024-03-07 RX ADMIN — PHENYLEPHRINE HYDROCHLORIDE 0.5 MCG/KG/MIN: 10 INJECTION INTRAVENOUS at 10:10

## 2024-03-07 RX ADMIN — EZETIMIBE 10 MG: 10 TABLET ORAL at 20:29

## 2024-03-07 RX ADMIN — ROCURONIUM BROMIDE 50 MG: 50 INJECTION, SOLUTION INTRAVENOUS at 09:56

## 2024-03-07 RX ADMIN — GABAPENTIN 100 MG: 100 CAPSULE ORAL at 22:21

## 2024-03-07 RX ADMIN — SODIUM CHLORIDE, POTASSIUM CHLORIDE, SODIUM LACTATE AND CALCIUM CHLORIDE: 600; 310; 30; 20 INJECTION, SOLUTION INTRAVENOUS at 18:36

## 2024-03-07 RX ADMIN — PROPOFOL 150 MG: 10 INJECTION, EMULSION INTRAVENOUS at 09:55

## 2024-03-07 RX ADMIN — VECURONIUM BROMIDE 2 MG: 1 INJECTION, POWDER, LYOPHILIZED, FOR SOLUTION INTRAVENOUS at 14:47

## 2024-03-07 RX ADMIN — BACLOFEN 10 MG: 10 TABLET ORAL at 20:29

## 2024-03-07 RX ADMIN — PROPOFOL 50 MG: 10 INJECTION, EMULSION INTRAVENOUS at 10:00

## 2024-03-07 RX ADMIN — TRANEXAMIC ACID 1950 MG: 650 TABLET ORAL at 07:51

## 2024-03-07 RX ADMIN — DEXMEDETOMIDINE HYDROCHLORIDE 8 MCG: 100 INJECTION, SOLUTION INTRAVENOUS at 11:45

## 2024-03-07 RX ADMIN — VECURONIUM BROMIDE 2 MG: 1 INJECTION, POWDER, LYOPHILIZED, FOR SOLUTION INTRAVENOUS at 13:01

## 2024-03-07 RX ADMIN — FERROUS SULFATE TAB 325 MG (65 MG ELEMENTAL FE) 325 MG: 325 (65 FE) TAB at 20:52

## 2024-03-07 RX ADMIN — DEXMEDETOMIDINE HYDROCHLORIDE 8 MCG: 100 INJECTION, SOLUTION INTRAVENOUS at 12:00

## 2024-03-07 RX ADMIN — AZELASTINE 1 SPRAY: 1 SPRAY, METERED NASAL at 20:38

## 2024-03-07 RX ADMIN — Medication 600 MG: at 20:30

## 2024-03-07 RX ADMIN — PROPOFOL 30 MCG/KG/MIN: 10 INJECTION, EMULSION INTRAVENOUS at 10:00

## 2024-03-07 RX ADMIN — FENTANYL CITRATE 50 MCG: 50 INJECTION INTRAMUSCULAR; INTRAVENOUS at 10:34

## 2024-03-07 RX ADMIN — FENTANYL CITRATE 25 MCG: 50 INJECTION INTRAMUSCULAR; INTRAVENOUS at 11:26

## 2024-03-07 RX ADMIN — CALCIUM POLYCARBOPHIL 1250 MG: 625 TABLET, FILM COATED ORAL at 20:29

## 2024-03-07 RX ADMIN — Medication 25 MCG: at 20:30

## 2024-03-07 RX ADMIN — PHENYLEPHRINE HYDROCHLORIDE 100 MCG: 10 INJECTION INTRAVENOUS at 13:26

## 2024-03-07 RX ADMIN — SODIUM CHLORIDE, POTASSIUM CHLORIDE, SODIUM LACTATE AND CALCIUM CHLORIDE: 600; 310; 30; 20 INJECTION, SOLUTION INTRAVENOUS at 11:35

## 2024-03-07 RX ADMIN — TRAMADOL HYDROCHLORIDE 50 MG: 50 TABLET, COATED ORAL at 18:33

## 2024-03-07 RX ADMIN — VANCOMYCIN HYDROCHLORIDE 1500 MG: 10 INJECTION, POWDER, LYOPHILIZED, FOR SOLUTION INTRAVENOUS at 20:53

## 2024-03-07 RX ADMIN — FENTANYL CITRATE 50 MCG: 50 INJECTION INTRAMUSCULAR; INTRAVENOUS at 09:55

## 2024-03-07 RX ADMIN — FENTANYL CITRATE 50 MCG: 50 INJECTION INTRAMUSCULAR; INTRAVENOUS at 12:13

## 2024-03-07 RX ADMIN — DEXAMETHASONE SODIUM PHOSPHATE 10 MG: 4 INJECTION, SOLUTION INTRA-ARTICULAR; INTRALESIONAL; INTRAMUSCULAR; INTRAVENOUS; SOFT TISSUE at 10:18

## 2024-03-07 ASSESSMENT — ACTIVITIES OF DAILY LIVING (ADL)
ADLS_ACUITY_SCORE: 26
ADLS_ACUITY_SCORE: 29
ADLS_ACUITY_SCORE: 26
ADLS_ACUITY_SCORE: 33
ADLS_ACUITY_SCORE: 29
ADLS_ACUITY_SCORE: 26
ADLS_ACUITY_SCORE: 29
ADLS_ACUITY_SCORE: 26
ADLS_ACUITY_SCORE: 26
ADLS_ACUITY_SCORE: 29
ADLS_ACUITY_SCORE: 26
ADLS_ACUITY_SCORE: 24

## 2024-03-07 ASSESSMENT — ENCOUNTER SYMPTOMS
SEIZURES: 0
DYSRHYTHMIAS: 0

## 2024-03-07 ASSESSMENT — COPD QUESTIONNAIRES: COPD: 1

## 2024-03-07 ASSESSMENT — LIFESTYLE VARIABLES: TOBACCO_USE: 0

## 2024-03-07 NOTE — ANESTHESIA CARE TRANSFER NOTE
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    Anesthesia Type:   General     Note:    Oropharynx: oropharynx clear of all foreign objects  Level of Consciousness: awake  Oxygen Supplementation: face mask  Level of Supplemental Oxygen (L/min / FiO2): 10  Independent Airway: airway patency satisfactory and stable  Dentition: dentition unchanged  Vital Signs Stable: post-procedure vital signs reviewed and stable  Report to RN Given: handoff report given  Patient transferred to: PACU    Handoff Report: Identifed the Patient, Identified the Reponsible Provider, Reviewed the pertinent medical history, Discussed the surgical course, Reviewed Intra-OP anesthesia mangement and issues during anesthesia, Set expectations for post-procedure period and Allowed opportunity for questions and acknowledgement of understanding  Vitals:  Vitals Value Taken Time   /80 03/07/24 1617   Temp     Pulse 82 03/07/24 1619   Resp 7 03/07/24 1619   SpO2 98 % 03/07/24 1619   Vitals shown include unfiled device data.    Electronically Signed By: HAYDER Shen CRNA  March 7, 2024  4:20 PM

## 2024-03-07 NOTE — ANESTHESIA PREPROCEDURE EVALUATION
Anesthesia Pre-Procedure Evaluation    Patient: Kenya Porter   MRN: [id number] : 1945        Procedure : Procedure(s):  left knee arthroplasty rotating hinge revision          Past Medical History:   Diagnosis Date    Anxiety     Chronic superficial gastritis with bleeding     Depression     Depression, major, single episode, mild (H24)     Hyperlipemia     Hypothyroidism     Loose stools     Major depressive disorder, single episode, moderate degree (H)     Mild intermittent asthma without complication     Morbid obesity with body mass index of 40.0-49.9 (H)     Obstructive sleep apnea     Osteoarthritis of lumbar spine     Osteoarthritis of lumbar spine     Osteoporosis     Pain in buttock     Pain in joint, multiple sites     Skin cancer     Skin cancer of nose     Sleep deficient     Vitamin D deficiency       Past Surgical History:   Procedure Laterality Date    AS TOTAL KNEE ARTHROPLASTY Left     BARIATRIC SURGERY      CARPAL TUNNEL RELEASE RT/LT Bilateral     CHOLECYSTECTOMY      COLONOSCOPY      femur fracture treatment Left     HEMIARTHROPLASTY SHOULDER FRACTURE Left     JOINT REPLACEMENT, HIP RT/LT Right     PANNICULECTOMY      refractictive surgery      RELEASE TRIGGER FINGER Right     3re and 4th finger    REVERSE TOTAL SHOULDER ARTHROPLASTY      herve-en-y procedure      GWENDOLYN and BSO      tearduct stent      tonsil and adenoidectomy        Allergies   Allergen Reactions    Hydromorphone Other (See Comments) and Palpitations     cardiomyopathy    Trazodone Other (See Comments)     Cardiac changes in EKG    EKG changes    Zolpidem      Other Reaction(s): Behavioral Disturbances, Delirium    Adhesive Tape      Paper tape  Contact dermatitis    Benzalkonium Chloride Itching     Erythema    In eye gtts only    Fluticasone Other (See Comments)     Sinus polyps    Hydrocodone Itching     Restless feeling    Robaxin [Methocarbamol] Headache    Codeine Anxiety and Unknown     "Hydrocodone-Acetaminophen Itching and Rash    Keflex [Cephalexin] Rash    Oxycodone Itching and Rash    Tetracycline Rash      Social History     Tobacco Use    Smoking status: Former     Types: Cigarettes    Smokeless tobacco: Never    Tobacco comments:     Quit 50 years ago   Substance Use Topics    Alcohol use: Never     Comment: quit 8 ago      Wt Readings from Last 1 Encounters:   03/07/24 98.4 kg (217 lb)        Anesthesia Evaluation            ROS/MED HX  ENT/Pulmonary:     (+) sleep apnea, uses CPAP,                    asthma    COPD,           (-) tobacco use   Neurologic:    (-) no seizures and no CVA   Cardiovascular:    (-) hypertension, CAD, CHF and arrhythmias   METS/Exercise Tolerance:     Hematologic:       Musculoskeletal:   (+)  arthritis,             GI/Hepatic:    (-) GERD and liver disease   Renal/Genitourinary:    (-) renal disease   Endo:     (+)          thyroid problem, hypothyroidism,    Obesity,    (-) Type I DM and Type II DM   Psychiatric/Substance Use:     (+) psychiatric history anxiety and depression       Infectious Disease:       Malignancy:       Other:            Physical Exam    Airway        Mallampati: III   TM distance: > 3 FB   Neck ROM: full   Mouth opening: > 3 cm    Respiratory Devices and Support         Dental  no notable dental history     (+) Minor Abnormalities - some fillings, tiny chips      Cardiovascular          Rhythm and rate: regular     Pulmonary           (+) decreased breath sounds           OUTSIDE LABS:  CBC: No results found for: \"WBC\", \"HGB\", \"HCT\", \"PLT\"  BMP:   Lab Results   Component Value Date    POTASSIUM 4.5 03/07/2024    CR 0.77 03/07/2024    GLC 98 03/07/2024     COAGS: No results found for: \"PTT\", \"INR\", \"FIBR\"  POC: No results found for: \"BGM\", \"HCG\", \"HCGS\"  HEPATIC: No results found for: \"ALBUMIN\", \"PROTTOTAL\", \"ALT\", \"AST\", \"GGT\", \"ALKPHOS\", \"BILITOTAL\", \"BILIDIRECT\", \"VENKATA\"  OTHER: No results found for: \"PH\", \"LACT\", \"A1C\", \"CARL\", \"PHOS\", " "\"MAG\", \"LIPASE\", \"AMYLASE\", \"TSH\", \"T4\", \"T3\", \"CRP\", \"SED\"    Anesthesia Plan    ASA Status:  3       Anesthesia Type: General.     - Airway: LMA   Induction: Intravenous, Propofol.   Maintenance: Balanced.        Consents    Anesthesia Plan(s) and associated risks, benefits, and realistic alternatives discussed. Questions answered and patient/representative(s) expressed understanding.     - Discussed:     - Discussed with:  Patient            Postoperative Care    Pain management: Multi-modal analgesia.   PONV prophylaxis: Ondansetron (or other 5HT-3), Dexamethasone or Solumedrol     Comments:    Other Comments: No hydromorphone, fentanyl is OK           Rashawn Prasad MD    I have reviewed the pertinent notes and labs in the chart from the past 30 days and (re)examined the patient.  Any updates or changes from those notes are reflected in this note.             # Drug Induced Platelet Defect: home medication list includes an antiplatelet medication  # Obesity: Estimated body mass index is 38.44 kg/m  as calculated from the following:    Height as of this encounter: 1.6 m (5' 3\").    Weight as of this encounter: 98.4 kg (217 lb).      "

## 2024-03-07 NOTE — OP NOTE
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  After adequate induction of a general anesthetic a bump was placed underneath the left hip.  Patient was given 3 g of Ancef for infection prophylaxis given 1 hour prior to incision we then performed a sterile prep and drape of the left lower extremity from the groin down to and including the foot.  After sterile prep and drape incision was made we used the previous anterior incision and extended it both proximally and distally and did a medial parapatellar arthrotomy.  She had extensive synovitis a synovectomy was done.  We then carefully dissected proximally and distally and were able to remove the plate and screws.  This was done with some difficulty.  Once the plate was removed the area of bony overgrowth over the top of the plate was removed using osteotome and a mallet.  We then did a subperiosteal exposure of the entire distal femur.  We then osteotomized the femur at the appropriate level.  Then placed a cortical strut with 2 cables additional fixation of the distal femur.  Then removed the tibial component from the tibia.  Then with a series of reaming and broaching we prepared the tibia for the tibial component and the rotating-hinge.  Once the tibial trial was in place we used a size 7 tibia  baseplate with a 45 mm ingrowth sleeve and a 20 x 60 stem extension.  We then prepared the femur used extra small distal femur with a 45 mm body 11 x 180 cemented stem.  Once the femoral trial was placed then assembled the trial rotating-hinge took the knee through range of motion the patella tracked ideally the good soft tissue balancing.  We did remove the trial components and assembled the actual components on the back table.  We began preparing the cement surfaces using the pulsatile jet lavage antibiotic saline.  We also soaked the wound in a dilute solution of Betadine for approximately 2 minutes.  We then irrigated again using the pulsatile jet lavage.  Then began cemented the tibial component in place taking care to not get any cement on the ingrowth sleeve we only cemented underneath the baseplate.  Once this was fully seated we direct attention to the femur a box plug was placed for cement restrictor and the cemented the femoral component in approximately 15 degrees of external rotation.  Then allowed the cement to harden as cement was hardened we soaked the knee in a dilute solution of Betadine for 3 minutes.  We then irrigated using pulsatile lavage used approximately 3 L antibiotic saline and the pulsatile jet lavage.  Once cement hardened we again did a trial with a hinge and patella tracked ideally we seem to have good tension across the hinge.  We then elected to put in a 12.5 polyethylene and assembled the hinge.  Once the pushing was locked in place we took the knee through range of motion again and the patella tracked ideally good good soft tissue balancing.  Then placed the knee in approximately 30 degrees of flexion irrigated using pulsatile lavage and reapproximated periosteal layer using 0 Vicryl sutures.  Fascial layer was closed using 0 Ethibond sutures.  This was oversewn using oh strata fix.  The arthrotomy was closed using a 0 Ethibond suture.  This was oversewn using oh strata fix.  Deep  subcu was closed in multiple layers using 0 Vicryl suture subcu was closed in multiple layers using 2-0 Vicryl skin was closed a running 3-0 Monocryl subcuticular stitch followed by Prineo dressing sterile dressing applied followed by a knee immobilizer the patient tolerated the procedure there were no intraoperative complications.  Patient was sent to the [facility] in stable condition.  Estimated blood loss 1000 cc of tourniquet was not used.  Plan will be for patient to get 24 hours Ancef for infection prophylaxis 30 days of Eliquis for DVT prophy prophylaxis.  Indication for procedure this patient is a 78-year-old female who is approximately a year out from ORIF of the distal femoral periprosthetic fracture this was done in [place].  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  I discussed the difficult nature of this problem with the patient treatment options recommended removal of hardware and proceeding with a removal of the total knee and conversion to a rotating-hinge.  I explained the risk of the surgery discussed included was not specific to infection aggressive neurologic complication DVT septic and aseptic loosening arthrofibrosis fracture maltracking of the patella and the possible need for further revision surgery she voiced understanding is wanted proceed.  This ends dictation dictating physician is Vorlicky

## 2024-03-07 NOTE — ANESTHESIA POSTPROCEDURE EVALUATION
Patient: Kenya Porter    Procedure: Procedure(s):  left knee arthroplasty rotating hinge revision       Anesthesia Type:  General    Note:     Postop Pain Control: Uneventful            Sign Out: Well controlled pain   PONV:    Neuro/Psych: Uneventful            Sign Out: Acceptable/Baseline neuro status   Airway/Respiratory: Uneventful            Sign Out: Acceptable/Baseline resp. status   CV/Hemodynamics: Uneventful            Sign Out: Acceptable CV status; No obvious hypovolemia; No obvious fluid overload   Other NRE:    DID A NON-ROUTINE EVENT OCCUR?            Last vitals:  Vitals Value Taken Time   /62 03/07/24 1730   Temp 36  C (96.8  F) 03/07/24 1617   Pulse 77 03/07/24 1741   Resp 10 03/07/24 1741   SpO2 98 % 03/07/24 1741   Vitals shown include unfiled device data.    Electronically Signed By: Rashawn Prasad MD  March 7, 2024  5:42 PM

## 2024-03-07 NOTE — ANESTHESIA PROCEDURE NOTES
Airway       Patient location during procedure: OR       Procedure Start/Stop Times: 3/7/2024 9:58 AM  Staff -        Anesthesiologist:  Rashawn Prasad MD       CRNA: Fatimah Martinez APRN CRNA       Performed By: anesthesiologist  Consent for Airway        Urgency: elective  Indications and Patient Condition       Indications for airway management: mal-procedural       Induction type:intravenous       Mask difficulty assessment: 1 - vent by mask    Final Airway Details       Final airway type: endotracheal airway       Successful airway: ETT - single  Endotracheal Airway Details        ETT size (mm): 7.0       Cuffed: yes       Successful intubation technique: direct laryngoscopy       DL Blade Type: Rendon 2       Grade View of Cords: 1       Adjucts: stylet       Position: Right       Measured from: gums/teeth       Secured at (cm): 21       Bite block used: None    Post intubation assessment        Placement verified by: capnometry, equal breath sounds and chest rise        Number of attempts at approach: 1       Number of other approaches attempted: 0       Secured with: tape       Ease of procedure: easy       Dentition: Intact and Unchanged    Medication(s) Administered   Medication Administration Time: 3/7/2024 9:58 AM

## 2024-03-07 NOTE — BRIEF OP NOTE
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) [T84.033A]  Post-operative diagnosis Same as pre-operative diagnosis    Procedure: left knee arthroplasty rotating hinge revision, Left - Knee    Surgeon: Surgeon(s) and Role:     * Federica Das MD - Primary     * Lamine Burgos PA-C - Assisting  Anesthesia: General with Block   Estimated Blood Loss: 1000 mL from 3/7/2024  9:49 AM to 3/7/2024  4:15 PM      Drains: None  Specimens:   ID Type Source Tests Collected by Time Destination   A : left knee fluid culture Tissue Knee, Left ANAEROBIC BACTERIAL CULTURE ROUTINE, GRAM STAIN, AEROBIC BACTERIAL CULTURE ROUTINE Federica Das MD 3/7/2024 10:50 AM    B : STAT HEMOGLOBIN, PLEASE CALL *[phone] Blood Vein HEMOGLOBIN Feedrica Das MD 3/7/2024  2:05 PM      Findings:   None.  Complications: None.  Implants:   Implant Name Type Inv. Item Serial No.  Lot No. LRB No. Used Action   BONE CEMENT SIMPLEX W/TOBRAMYCIN 6197-9-001 - [id number] Cement, Bone BONE CEMENT SIMPLEX W/TOBRAMYCIN 6197-9-001  [employer] [id number] Left 3 Implanted   left knee screw      Left 10 Explanted   left knee plate      Left 1 Explanted   left knee poly      Left 1 Explanted   GRAFT BONE STRUT CORTICAL [id number] - [id number] Bone/Tissue/Biologic GRAFT BONE STRUT CORTICAL [id number] [employer]  Left 1 Implanted   femur component      Left 1 Explanted   GRAFT MEDT DBM ORTHOBLEND SM DEFECT 10CC [id number] - [id number] Bone/Tissue/Biologic GRAFT MEDT DBM ORTHOBLEND SM DEFECT 10CC [id number] [employer]  Left 1 Implanted   IMP CABLE SYN ORTHO COCR W/CRIMP [id number] 611.105.01S - [id number] Metallic Hardware/[place] IMP CABLE SYN ORTHO  COCR W/CRIMP [id number] 611.105.01S  [facility] [id number] Left 2 Implanted   BONE CEMENT RESTRICTOR RICE FEMORAL 18.5MM [id number] - [id number] Cement, Bone BONE CEMENT RESTRICTOR RICE FEMORAL 18.5MM [id number]  [employer]-R [id number] Left 1 Implanted   STEM XTN 60MM 20MM ATTUNE KN PRFT REV STRL LF - [id number] Total Joint Component/Insert STEM XTN 60MM 20MM ATTUNE KN PRFT REV STRL LF  [employer]- [id number] Left 1 Implanted   ATUNE REV RP TIB BASE SZ 6 ELIU - [id number] Total Joint Component/Insert ATUNE REV RP TIB BASE SZ 6 ELIU  [employer]- [id number] Left 1 Implanted   DISTAL FEMORAL REPLACEMENT COMPONENTS L - [id number] Total Joint Component/Insert DISTAL FEMORAL REPLACEMENT COMPONENTS L  [employer]- [id number] Left 1 Implanted   segmental component lps    Depuy [id number] Left 1 Implanted   lps femoral stem bowed    Depuy [id number] Left 1 Implanted   attune knee system revision tibial sleeve porocoat partially coated    Depuy [id number] Left 1 Implanted   left knee baseplate      Left 1 Explanted   attune knee system revision LPS insert AOX 12mm x-small    Depuy [id number] Left 1 Implanted

## 2024-03-08 ENCOUNTER — APPOINTMENT (OUTPATIENT)
Dept: PHYSICAL THERAPY | Facility: CLINIC | Age: 79
DRG: 467 | End: 2024-03-08
Attending: ORTHOPAEDIC SURGERY
Payer: COMMERCIAL

## 2024-03-08 ENCOUNTER — APPOINTMENT (OUTPATIENT)
Dept: OCCUPATIONAL THERAPY | Facility: CLINIC | Age: 79
DRG: 467 | End: 2024-03-08
Attending: PHYSICIAN ASSISTANT
Payer: COMMERCIAL

## 2024-03-08 LAB
ALBUMIN UR-MCNC: NEGATIVE MG/DL
APPEARANCE UR: CLEAR
BILIRUB UR QL STRIP: NEGATIVE
COLOR UR AUTO: NORMAL
GLUCOSE SERPL-MCNC: 117 MG/DL (ref 70–99)
GLUCOSE UR STRIP-MCNC: NEGATIVE MG/DL
HGB BLD-MCNC: 8.3 G/DL (ref 11.7–15.7)
HGB UR QL STRIP: NEGATIVE
KETONES UR STRIP-MCNC: NEGATIVE MG/DL
LEUKOCYTE ESTERASE UR QL STRIP: NEGATIVE
NITRATE UR QL: NEGATIVE
PH UR STRIP: 5.5 [PH] (ref 5–7)
SP GR UR STRIP: 1 (ref 1–1.03)
UROBILINOGEN UR STRIP-MCNC: NORMAL MG/DL

## 2024-03-08 PROCEDURE — 250N000013 HC RX MED GY IP 250 OP 250 PS 637: Performed by: ORTHOPAEDIC SURGERY

## 2024-03-08 PROCEDURE — 250N000013 HC RX MED GY IP 250 OP 250 PS 637: Performed by: PHYSICIAN ASSISTANT

## 2024-03-08 PROCEDURE — G0463 HOSPITAL OUTPT CLINIC VISIT: HCPCS

## 2024-03-08 PROCEDURE — 120N000001 HC R&B MED SURG/OB

## 2024-03-08 PROCEDURE — 97530 THERAPEUTIC ACTIVITIES: CPT | Mod: GP | Performed by: PHYSICAL THERAPIST

## 2024-03-08 PROCEDURE — 82947 ASSAY GLUCOSE BLOOD QUANT: CPT | Performed by: ORTHOPAEDIC SURGERY

## 2024-03-08 PROCEDURE — 85018 HEMOGLOBIN: CPT | Performed by: PHYSICIAN ASSISTANT

## 2024-03-08 PROCEDURE — 36415 COLL VENOUS BLD VENIPUNCTURE: CPT | Performed by: HOSPITALIST

## 2024-03-08 PROCEDURE — 36415 COLL VENOUS BLD VENIPUNCTURE: CPT | Performed by: ORTHOPAEDIC SURGERY

## 2024-03-08 PROCEDURE — 97116 GAIT TRAINING THERAPY: CPT | Mod: GP | Performed by: PHYSICAL THERAPIST

## 2024-03-08 PROCEDURE — 81003 URINALYSIS AUTO W/O SCOPE: CPT | Performed by: HOSPITALIST

## 2024-03-08 PROCEDURE — 97110 THERAPEUTIC EXERCISES: CPT | Mod: GP | Performed by: PHYSICAL THERAPIST

## 2024-03-08 PROCEDURE — 87040 BLOOD CULTURE FOR BACTERIA: CPT | Performed by: HOSPITALIST

## 2024-03-08 PROCEDURE — 99232 SBSQ HOSP IP/OBS MODERATE 35: CPT | Performed by: INTERNAL MEDICINE

## 2024-03-08 PROCEDURE — 97165 OT EVAL LOW COMPLEX 30 MIN: CPT | Mod: GO

## 2024-03-08 RX ORDER — AMOXICILLIN 250 MG
1 CAPSULE ORAL 2 TIMES DAILY
Qty: 30 TABLET | Refills: 0 | Status: SHIPPED | OUTPATIENT
Start: 2024-03-08

## 2024-03-08 RX ORDER — HYDROXYZINE HYDROCHLORIDE 10 MG/1
10 TABLET, FILM COATED ORAL EVERY 6 HOURS PRN
Qty: 30 TABLET | Refills: 0 | Status: SHIPPED | OUTPATIENT
Start: 2024-03-08

## 2024-03-08 RX ORDER — ONDANSETRON 4 MG/1
4 TABLET, ORALLY DISINTEGRATING ORAL EVERY 6 HOURS PRN
Qty: 10 TABLET | Refills: 0 | Status: SHIPPED | OUTPATIENT
Start: 2024-03-08

## 2024-03-08 RX ORDER — TRAMADOL HYDROCHLORIDE 50 MG/1
50 TABLET ORAL EVERY 6 HOURS PRN
Qty: 30 TABLET | Refills: 0 | Status: SHIPPED | OUTPATIENT
Start: 2024-03-08

## 2024-03-08 RX ADMIN — FLUOXETINE HYDROCHLORIDE 40 MG: 20 CAPSULE ORAL at 08:37

## 2024-03-08 RX ADMIN — GABAPENTIN 100 MG: 100 CAPSULE ORAL at 21:43

## 2024-03-08 RX ADMIN — ACETAMINOPHEN 975 MG: 325 TABLET, FILM COATED ORAL at 21:43

## 2024-03-08 RX ADMIN — BACLOFEN 10 MG: 10 TABLET ORAL at 08:37

## 2024-03-08 RX ADMIN — AZELASTINE 1 SPRAY: 1 SPRAY, METERED NASAL at 09:41

## 2024-03-08 RX ADMIN — ATORVASTATIN CALCIUM 40 MG: 40 TABLET, FILM COATED ORAL at 21:55

## 2024-03-08 RX ADMIN — TRAMADOL HYDROCHLORIDE 50 MG: 50 TABLET, COATED ORAL at 23:55

## 2024-03-08 RX ADMIN — HYDROXYZINE HYDROCHLORIDE 10 MG: 10 TABLET, FILM COATED ORAL at 23:55

## 2024-03-08 RX ADMIN — MONTELUKAST 10 MG: 10 TABLET, FILM COATED ORAL at 21:43

## 2024-03-08 RX ADMIN — PANTOPRAZOLE SODIUM 40 MG: 40 TABLET, DELAYED RELEASE ORAL at 08:38

## 2024-03-08 RX ADMIN — ACETAMINOPHEN 975 MG: 325 TABLET, FILM COATED ORAL at 17:25

## 2024-03-08 RX ADMIN — FERROUS SULFATE TAB 325 MG (65 MG ELEMENTAL FE) 325 MG: 325 (65 FE) TAB at 19:33

## 2024-03-08 RX ADMIN — OXYCODONE HYDROCHLORIDE AND ACETAMINOPHEN 1000 MG: 500 TABLET ORAL at 19:31

## 2024-03-08 RX ADMIN — Medication 600 MG: at 19:31

## 2024-03-08 RX ADMIN — ACETAMINOPHEN 975 MG: 325 TABLET, FILM COATED ORAL at 08:37

## 2024-03-08 RX ADMIN — CALCIUM POLYCARBOPHIL 1250 MG: 625 TABLET, FILM COATED ORAL at 19:30

## 2024-03-08 RX ADMIN — TRAMADOL HYDROCHLORIDE 50 MG: 50 TABLET, COATED ORAL at 01:18

## 2024-03-08 RX ADMIN — EZETIMIBE 10 MG: 10 TABLET ORAL at 19:31

## 2024-03-08 RX ADMIN — BACLOFEN 10 MG: 10 TABLET ORAL at 21:44

## 2024-03-08 RX ADMIN — CETIRIZINE HYDROCHLORIDE 10 MG: 10 TABLET, FILM COATED ORAL at 08:38

## 2024-03-08 RX ADMIN — Medication 25 MCG: at 19:32

## 2024-03-08 RX ADMIN — APIXABAN 2.5 MG: 2.5 TABLET, FILM COATED ORAL at 08:38

## 2024-03-08 RX ADMIN — APIXABAN 2.5 MG: 2.5 TABLET, FILM COATED ORAL at 21:44

## 2024-03-08 RX ADMIN — TRAMADOL HYDROCHLORIDE 50 MG: 50 TABLET, COATED ORAL at 15:04

## 2024-03-08 RX ADMIN — DOCUSATE SODIUM 50 MG AND SENNOSIDES 8.6 MG 1 TABLET: 8.6; 5 TABLET, FILM COATED ORAL at 21:44

## 2024-03-08 ASSESSMENT — ACTIVITIES OF DAILY LIVING (ADL)
ADLS_ACUITY_SCORE: 29
ADLS_ACUITY_SCORE: 29
PREVIOUS_RESPONSIBILITIES: MEAL PREP;LAUNDRY;MEDICATION MANAGEMENT;FINANCES;DRIVING
ADLS_ACUITY_SCORE: 29
ADLS_ACUITY_SCORE: 30
ADLS_ACUITY_SCORE: 34
ADLS_ACUITY_SCORE: 29
ADLS_ACUITY_SCORE: 30
ADLS_ACUITY_SCORE: 29
ADLS_ACUITY_SCORE: 30
ADLS_ACUITY_SCORE: 34
ADLS_ACUITY_SCORE: 29
ADLS_ACUITY_SCORE: 29
ADLS_ACUITY_SCORE: 34
ADLS_ACUITY_SCORE: 29
ADLS_ACUITY_SCORE: 29
ADLS_ACUITY_SCORE: 30
ADLS_ACUITY_SCORE: 29
ADLS_ACUITY_SCORE: 30

## 2024-03-08 NOTE — PROGRESS NOTES
"Kenya Porter  3/8/2024  POD # 1 s/p Revision LTKA/Distal femoral replacement  Admit Date:  3/7/2024      Doing well.  Clean wound without signs of infection.  Normal healing wound.  No immediate surgical complications identified.  No excessive bleeding  Pain well-controlled.  Objective:  Blood pressure (!) 119/96, pulse 72, temperature 98.2  F (36.8  C), temperature source Oral, resp. rate 17, height 1.6 m (5' 3\"), weight 98.4 kg (217 lb), SpO2 98%.    Temperatures:  Current - Temp: 98.2  F (36.8  C); Max - Temp  Av.8  F (36.6  C)  Min: 96.8  F (36  C)  Max: 98.3  F (36.8  C)  Pulse range: Pulse  Av.2  Min: 68  Max: 80  Blood pressure range: Systolic (24hrs), Av , Min:102 , Max:143   ; Diastolic (24hrs), Av, Min:55, Max:96    Exam:  CMS: intact  alert, stable, wound ok  Calf s/nt  DF/PF   Communicates clearly with examiner    Labs:  Recent Labs   Lab Test 24  0744   POTASSIUM 4.5     Recent Labs   Lab Test 24  1405   HGB 11.1*     No results for input(s): \"INR\" in the last 28349 hours.  No results for input(s): \"PLT\" in the last 72868 hours.    PLAN: WBAT in the LLE. KI on at all times. Eliquis for dvt ppx. Tramadol and tylenol for pain control. Patient state she is allergic to everything else. Plan on discharging home Saturday and patient has friends and family in town to help per her. Follow up with us again in 2 weeks. Leave the ace wrap and dressing intact until she sees us next week.     "

## 2024-03-08 NOTE — PROGRESS NOTES
Alert and oriented x 4. Dressing CDI. VSS. On oxygen @ 2 LPM via NC. CMS intact. Stayed in bed the entire shift. Rowland catheter pulled out. External catheter in place. Running IV LR @ 75 ml/hr on the L lower forearm. Pain managed with PRN Atarax and scheduled Tylenol

## 2024-03-08 NOTE — PROGRESS NOTES
"Kenya Porter  3/7/2024  POD # 0 sp revision tka    Admit Date:  3/7/2024      Doing well.  No immediate surgical complications identified.  No excessive bleeding  Pain well-controlled.  Objective:  Blood pressure 137/75, pulse 75, temperature 98.3  F (36.8  C), temperature source Oral, resp. rate 13, height 1.6 m (5' 3\"), weight 98.4 kg (217 lb), SpO2 98%.    Temperatures:  Current - Temp: 98.3  F (36.8  C); Max - Temp  Av.4  F (36.3  C)  Min: 96.8  F (36  C)  Max: 98.3  F (36.8  C)  Pulse range: Pulse  Av.4  Min: 68  Max: 80  Blood pressure range: Systolic (24hrs), Av , Min:102 , Max:143   ; Diastolic (24hrs), Av, Min:55, Max:81    Exam:  CMS: intact  alert, stable, wound ok  Calf nontender b l e.      Labs:  Recent Labs   Lab Test 24  0744   POTASSIUM 4.5     Recent Labs   Lab Test 24  1405   HGB 11.1*     No results for input(s): \"INR\" in the last 15996 hours.  No results for input(s): \"PLT\" in the last 02993 hours.    PLAN: Start physical therapy  Pain control measures  Additional problems to be followed by medicine physician  Cont post-op routine.        "

## 2024-03-08 NOTE — PROGRESS NOTES
[facility]    Internal Medicine Hospitalist Progress Note  03/08/2024  I evaluated patient on the above date.    Zhen Gonzalez Jr., MD  [phone] (p)  Text Page  Vocera        Assessment & Plan New actions/orders today (03/08/2024) are underlined. All lab results in the assessment and plan were reviewed.    Ms. Kenya Porter is a 79 yo female with history including asthma, non-obstructive CAD, DLD, SAIRA, depression/anxiety, OA, OP, GERD and h/o skin cancer; with recent periprosthetic left femur dx, who underwent revision L TKA/distal femoral replacement on 3/7/2024. IM consulted for medical co-management.    Periprosthetic left femur fracture - s/p removal hardware left femur, ORIF left femur,revision total knee arthroplasty to a rotating hinge, on 3/7/2024.  - Post-op management per Ortho.  - Continue acetaminophen scheduled, baclofen scheduled; gabapentin scheduled; PRN acetaminophen; PRN hydroxyzine; PRN tramadol; minimize opioids as able.  - Continue therapies.  - Continue apixaban for prophylaxis per Ortho.    Blood loss anemia.  Dizziness, suspect related to above.  * Hgb 11.1 prior to surgery 3/7. Started PO iron after surgery 3/7.  * On 3/8, hgb 8.3. Pt a bit dizzy with BP's lower than baseline, but subsequently feeling OK.   Recent Labs   Lab 03/08/24  0740 03/07/24  1405   HGB 8.3* 11.1*   - Continue to monitor CBC per Ortho.  - Continue iron.    Asthma.  Allergies.  - Continue azelastine nasal, cetirizine, montelukast and PRN albuterol.    CAD, nonobstructive.  DLD.  Mild AS.  Chronic edema.  H/o junctional tachycardia.  * Pt was seen by Cardiology 11/2023 for abnormal stress test; also had h/o chest pain relieved with NTG. Found with moderate coronary calcifications on CT screening. Overall, felt by Cardiology that pt unlikely had obstructive CAD.   - Continue atorvastatin, ezetimibe, PRN NTG.  - Continue PRN HCTZ for swelling.  - Hold PTA ASA for now.    SAIRA.  - Continue  "CPAP.    Depression/anxiety.  - Continue fluoxetine.    GERD.  - Continue pantoprazole.      Clinically Significant Risk Factors                         # Obesity: Estimated body mass index is 38.44 kg/m  as calculated from the following:    Height as of this encounter: 1.6 m (5' 3\").    Weight as of this encounter: 98.4 kg (217 lb)., PRESENT ON ADMISSION     # Asthma: noted on problem list          COVID-19 testing.  COVID-19 PCR Results           No data to display              COVID-19 Antibody Results, Testing for Immunity           No data to display                Diet: Advance Diet as Tolerated: Regular Diet Adult    Prophylaxis: PCD's, ambulation. Apixaban per Ortho.  Rowland Catheter: Not present  Lines: None     Code Status: Full Code    Disposition Plan   Expected discharge: Likely 3/9 recommended to prior living arrangement per  Ortho .  Entered: Zhen Gonzalez MD 03/08/2024, 12:49 PM         Interval History   Some dizziness with soft BP's earlier.  Feeling better now.    -Data reviewed today: I reviewed all new labs and imaging over the last 24 hours. I personally reviewed no images or EKG's today.    Physical Exam    , Blood pressure 94/54, pulse 74, temperature 99.1  F (37.3  C), temperature source Oral, resp. rate 16, height 1.6 m (5' 3\"), weight 98.4 kg (217 lb), SpO2 96%. O2 Device: Nasal cannula Oxygen Delivery: 2 LPM  Vitals:    03/07/24 0759   Weight: 98.4 kg (217 lb)     Vital Signs with Ranges  Temp:  [96.8  F (36  C)-99.1  F (37.3  C)] 99.1  F (37.3  C)  Pulse:  [68-80] 74  Resp:  [6-22] 16  BP: ()/(49-96) 94/54  SpO2:  [88 %-98 %] 96 %  Patient Vitals for the past 24 hrs:   BP Temp Temp src Pulse Resp SpO2   03/08/24 0835 94/54 -- -- -- -- --   03/08/24 0825 105/68 -- -- -- -- --   03/08/24 0822 97/65 -- -- -- -- --   03/08/24 0820 133/64 -- -- -- -- --   03/08/24 0759 97/52 99.1  F (37.3  C) Oral 74 16 96 %   03/08/24 0547 109/49 -- -- -- -- --   03/08/24 0517 (!) 84/62 98.4  F " "(36.9  C) Oral 75 16 98 %   03/08/24 0100 (!) 119/96 98.2  F (36.8  C) Oral 72 17 98 %   03/07/24 2100 (!) 143/60 98.3  F (36.8  C) Oral 69 16 92 %   03/07/24 2003 127/72 -- -- 71 14 92 %   03/07/24 1900 117/66 -- -- 73 13 96 %   03/07/24 1830 137/75 -- -- 75 13 98 %   03/07/24 1800 137/72 98.3  F (36.8  C) Oral 77 13 97 %   03/07/24 1730 125/62 -- -- 68 (!) 6 97 %   03/07/24 1715 123/62 -- -- 76 17 (!) 88 %   03/07/24 1700 (!) 143/81 -- -- 72 16 96 %   03/07/24 1645 (!) 140/74 -- -- 74 (!) 9 97 %   03/07/24 1630 130/78 -- -- 80 22 95 %   03/07/24 1617 -- 96.8  F (36  C) Temporal -- -- 92 %     I/O's Last 24 hours  I/O last 3 completed shifts:  In: 3006.69 [I.V.:2506.69]  Out: 2775 [Urine:1775; Blood:1000]    Constitutional: Awake, alert, oriented, pleasant.  Respiratory: Diminished in bases. No crackles or wheezes.  Cardiovascular: RRR, no m/r/g.  GI: Soft, nt, nd, +BS.  Skin/Integumen:   Other:        Data    Labs reviewed.  Recent Labs   Lab 03/08/24  0740 03/07/24  1405 03/07/24  0744   HGB 8.3* 11.1*  --    POTASSIUM  --   --  4.5   CR  --   --  0.77   *  --  98     No lab results found.  Recent Labs   Lab 03/08/24  0740 03/07/24  0744   * 98     No lab results found.    No results for input(s): \"INR\", \"[id number]\" in the last 168 hours.  No results for input(s): \"WBC\", \"CRP\", \"DD\", \"LDH\", \"FIBR\", \"ROSANGELA\", \"IL6B\", \"LACT\", \"LACTS\", \"PCAL\", \"[id number]\" in the last 168 hours.    MICRO:  CULTURES (INCLUDING BLOOD AND URINE):  Recent Labs   Lab 03/07/24  1050   CULTURE No growth, less than 1 day       Recent Results (from the past 24 hour(s))   XR Knee Port Left 1/2 Views    Narrative    KNEE ONE-TWO VIEWS LEFT  3/7/2024 2:39 PM     HISTORY: Intraoperative left knee.  COMPARISON: 3/7/2024      Impression    IMPRESSION: Status post recent left total knee arthroplasty revision  with longstem components and resection of the distal femur. No  immediate hardware complication. Expected postsurgical soft " tissue  edema and subcutaneous emphysema. No acute fracture. The patella  appears subluxed laterally. Osteopenia.    LINN GUTIERREZ MD         SYSTEM ID:  [id number]       Medications   All medications were reviewed.    Infusions:   lactated ringers 75 mL/hr at 03/07/24 1836     Scheduled Medications:   acetaminophen  975 mg Oral TID    apixaban ANTICOAGULANT  2.5 mg Oral BID    atorvastatin  40 mg Oral QPM    azelastine  1 spray Both Nostrils BID    baclofen  10 mg Oral BID    calcium carbonate  600 mg Oral QPM    calcium polycarbophil  1,250 mg Oral QPM    cetirizine  10 mg Oral Daily    ezetimibe  10 mg Oral QPM    ferrous sulfate  325 mg Oral QPM    FLUoxetine  40 mg Oral QAM    gabapentin  100 mg Oral At Bedtime    montelukast  10 mg Oral At Bedtime    pantoprazole  40 mg Oral Daily    polyethylene glycol  17 g Oral Daily    ROPivacaine (NAROPIN) 5 MG/ mg, ketorolac (TORADOL) 30 mg, EPINEPHrine (ADRENALIN) 0.6 mg in sodium chloride 0.9 % 50 mL (ORTHO LAURENT LOW DOSE)   INTRA-ARTICULAR Once    scopolamine   Transdermal Q8H    selenium  50 mcg Oral QPM    senna-docusate  1 tablet Oral BID    sodium chloride (PF)  3 mL Intracatheter Q8H    vitamin B complex with vitamin C  1 tablet Oral QPM    vitamin C  1,000 mg Oral QPM    Vitamin D3  25 mcg Oral QPM     PRN Medications:  [START ON 3/10/2024] acetaminophen, albuterol, sore throat, [START ON 3/10/2024] bisacodyl, cyclobenzaprine, diphenhydrAMINE, hydrochlorothiazide, hydrOXYzine HCl, lidocaine 4%, lidocaine (buffered or not buffered), loperamide, [START ON 3/9/2024] magnesium hydroxide, naloxone **OR** naloxone **OR** naloxone **OR** naloxone, nitroGLYcerin, ondansetron **OR** ondansetron, oxymetazoline, prochlorperazine **OR** prochlorperazine, ROPivacaine (NAROPIN) 5 MG/ mg, ketorolac (TORADOL) 30 mg, EPINEPHrine (ADRENALIN) 0.6 mg in sodium chloride 0.9 % 50 mL (ORTHO LAURENT LOW DOSE), sodium chloride (PF), traMADol

## 2024-03-08 NOTE — PROGRESS NOTES
Arrived from Recovery room.  A&O, able to make needs known.   Oriented to room/unit.  Moderate surgical pain, ice pack applied.  Ortho Stoplight Tool discussed w/ pt.  Pt's daughter at the bedside.

## 2024-03-08 NOTE — PLAN OF CARE
Goal Outcome Evaluation:      Plan of Care Reviewed With: patient, child    Overall Patient Progress: improvingOverall Patient Progress: improving    Outcome Evaluation: Difficulty fully voiding this shift, did not exceed 500 Bladder Scan this shift.  Pain well-managed this shift.    Patient vital signs are at baseline: Yes  Patient able to ambulate as they were prior to admission or with assist devices provided by therapies during their stay:  Yes  Patient MUST void prior to discharge:  Yes   Difficulty fully voiding this shift, did not exceed 500 Bladder Scan this shift.  Patient able to tolerate oral intake:  Yes  Pain has adequate pain control using Oral analgesics:  Yes  Does patient have an identified :  Yes  Has goal D/C date and time been discussed with patient:  Yes    Dx:Left TKA rotating hinge revision  POD#1    A&Ox4.   CMS Intact.   VSS on RA.   Up with Ax1 GB and walker.   Voiding in BR. Difficulty fully voiding this shift, did not exceed 500 Bladder Scan this shift.   Left PIV SL.  Regular Diet.  Pain controlled with Tramadol, Tylenol.   Continue to monitor.

## 2024-03-08 NOTE — PROGRESS NOTES
03/08/24 0945   Appointment Info   Signing Clinician's Name / Credentials (OT) Dragan Akers OTR/L   Living Environment   People in Home alone   Current Living Arrangements independent living facility   Home Accessibility no concerns   Transportation Anticipated family or friend will provide   Living Environment Comments Pt reports living alone in senior living apartment w/ elevator access. Tub shower w/ shower chair and grab bars. Commode over toilet.   Self-Care   Usual Activity Tolerance good   Current Activity Tolerance fair   Equipment Currently Used at Home walker, rolling   Fall history within last six months no   Activity/Exercise/Self-Care Comment Pt reports being IND w/ all ADL's at baseline prior to admission.   Instrumental Activities of Daily Living (IADL)   Previous Responsibilities meal prep;laundry;medication management;finances;driving   IADL Comments Pt reports having assist w/ housekeeping. IND w/ all other IADL's. Pt reports that they still drive.   General Information   Onset of Illness/Injury or Date of Surgery 03/07/24   Referring Physician Lamine Burgos PA-C   Patient/Family Therapy Goal Statement (OT) Return to home.   Additional Occupational Profile Info/Pertinent History of Current Problem Pt is a 78 y.o. female s/p L TKA revision on 3/7/2024, POD#1   Existing Precautions/Restrictions fall;weight bearing   Left Lower Extremity (Weight-bearing Status) weight-bearing as tolerated (WBAT)   Cognitive Status Examination   Orientation Status orientation to person, place and time   Visual Perception   Visual Impairment/Limitations corrective lenses for reading   Sensory   Sensory Quick Adds sensation intact   Pain Assessment   Patient Currently in Pain Yes, see Vital Sign flowsheet  (3/10 L knee)   Range of Motion Comprehensive   General Range of Motion no range of motion deficits identified   Strength Comprehensive (MMT)   General Manual Muscle Testing (MMT) Assessment no strength  deficits identified   Bed Mobility   Comment (Bed Mobility) Pt up in chair on arrival   Transfers   Transfers sit-stand transfer;toilet transfer   Sit-Stand Transfer   Sit/Stand Transfer Comments Not assessed this date   Toilet Transfer   Toilet Transfer Comments Not assessed this date   Activities of Daily Living   BADL Assessment/Intervention upper body dressing;lower body dressing;grooming;toileting   Upper Body Dressing Assessment/Training   Comment, (Upper Body Dressing) IND per clinical judgement   Lower Body Dressing Assessment/Training   Comment, (Lower Body Dressing) Mod IND per clinical judgement   Grooming Assessment/Training   Position (Grooming) sink side   Comment, (Grooming) Mod IND per clinical judgement   Clinical Impression   Criteria for Skilled Therapeutic Interventions Met (OT) Yes, treatment indicated   OT Diagnosis Decreased ADL independence   Influenced by the following impairments L TKA   OT Problem List-Impairments impacting ADL problems related to;activity tolerance impaired;pain   Assessment of Occupational Performance 1-3 Performance Deficits   Identified Performance Deficits LB dressing   Planned Therapy Interventions (OT) ADL retraining;home program guidelines;progressive activity/exercise;risk factor education   Clinical Decision Making Complexity (OT) problem focused assessment/low complexity   Risk & Benefits of therapy have been explained evaluation/treatment results reviewed;care plan/treatment goals reviewed;risks/benefits reviewed;current/potential barriers reviewed;patient   OT Total Evaluation Time   OT Eval, Low Complexity Minutes (07438) 10   OT Goals   Therapy Frequency (OT) Daily   OT Predicted Duration/Target Date for Goal Attainment 03/12/24   OT Goals Hygiene/Grooming;Upper Body Dressing;Lower Body Dressing;Toilet Transfer/Toileting   OT: Hygiene/Grooming modified independent;while standing   OT: Upper Body Dressing Independent;including set-up/clothing retrieval   OT:  Lower Body Dressing Modified independent;using adaptive equipment;including set-up/clothing retrieval   OT: Toilet Transfer/Toileting Modified independent;cleaning and garment management   Self-Care/Home Management   Self-Care/Home Mgmt/ADL, Compensatory, Meal Prep Minutes (58489) 6   Symptoms Noted During/After Treatment (Meal Preparation/Planning Training) none   Treatment Detail/Skilled Intervention Pt up in chair on arrival w/ daughter and RN present in room; agreeable for OT evaluation initially. Pt reporting having low BP w/ PT during session d/t low hemoglobin. Educated pt on currentl hemoglobin levels and both pt and family were declining to ambulate. Educated pt on safety w/ mobility and pt continuining to politely decline. Time spent educating pt on home safety precautions and pt verbalized understanding. Pt initially w/ no concerns at this time w/ discharging home. Pt remained seated up in chair w/ family present in room w/ all needs met.   OT Discharge Planning   OT Plan TB dressing, toilet transfers, g/h sinkside   OT Discharge Recommendation (DC Rec)   (Defer to Ortho)   OT Rationale for DC Rec Pt is currently functioning below baseline as expected following L TKA. Pt lives alone in ILF and reports being IND w/ all I/ADL's at baseline prior to admission. Anticipate pt will be safe to d/c home w/ assist from family.   OT Brief overview of current status See above   Total Session Time   Timed Code Treatment Minutes 6   Total Session Time (sum of timed and untimed services) 16

## 2024-03-08 NOTE — PROGRESS NOTES
03/07/24 1900   Appointment Info   Signing Clinician's Name / Credentials (PT) Tom Charlton DPT   Rehab Comments (PT) (S)  WBAT LLE, KI to be worn all times for 1 month with no ROM to knee   Living Environment   People in Home alone   Current Living Arrangements independent living facility   Home Accessibility no concerns   Transportation Anticipated family or friend will provide   Living Environment Comments pt lives alone in senior living apt with elevator access   Self-Care   Usual Activity Tolerance good   Current Activity Tolerance poor   Equipment Currently Used at Home walker, rolling   Fall history within last six months no   Activity/Exercise/Self-Care Comment At baseline pt is IND with mobility, was using 4WW recently, has FWW as well. Will have friend come stay with her for a few weeks for assist as needed, daughter also lives close by   General Information   Onset of Illness/Injury or Date of Surgery 03/07/24   Referring Physician Lamine Burgos PA-C   Patient/Family Therapy Goals Statement (PT) go home   Pertinent History of Current Problem (include personal factors and/or comorbidities that impact the POC) Pt is a 78 y.o. female s/p L TKA revision on 3/7/2024, POD#0   Existing Precautions/Restrictions fall;weight bearing  (KI to be worn all times for 1 month)   Weight-Bearing Status - LLE weight-bearing as tolerated   Weight-Bearing Status - RLE full weight-bearing   Cognition   Affect/Mental Status (Cognition) WNL   Orientation Status (Cognition) oriented x 4   Follows Commands (Cognition) WNL   Pain Assessment   Patient Currently in Pain No   Integumentary/Edema   Integumentary/Edema no deficits were identifed   Range of Motion (ROM)   Range of Motion ROM deficits secondary to surgical procedure   ROM Comment Defictis with L knee post-op with KI to be worn all times and no knee ROM, otherwise appears grossly WFL   Strength (Manual Muscle Testing)   Strength (Manual Muscle Testing) Able to  perform R SLR;Able to perform L SLR   Strength Comments not formally assessed, appeard to have gross antigravity strength with movements in bed   Bed Mobility   Comment, (Bed Mobility) did not observe   Transfers   Comment, (Transfers) did not observe   Gait/Stairs (Locomotion)   Comment, (Gait/Stairs) did not observe   Sensory Examination   Sensory Perception patient reports no sensory changes   Clinical Impression   Criteria for Skilled Therapeutic Intervention Yes, treatment indicated   PT Diagnosis (PT) impaired gait and mobility   Influenced by the following impairments pain, deficits with ROM, strength, balance, KI to be worn at all times 1 month   Functional limitations due to impairments bed mobility, transfers, amb, ADL's   Clinical Presentation (PT Evaluation Complexity) stable   Clinical Presentation Rationale PMH and clinical judgement   Clinical Decision Making (Complexity) low complexity   Planned Therapy Interventions (PT) balance training;bed mobility training;cryotherapy;gait training;home exercise program;patient/family education;ROM (range of motion);strengthening;stair training;transfer training;progressive activity/exercise   Risk & Benefits of therapy have been explained evaluation/treatment results reviewed;care plan/treatment goals reviewed;risks/benefits reviewed;current/potential barriers reviewed;participants voiced agreement with care plan;participants included;patient;daughter   PT Total Evaluation Time   PT Eval, Low Complexity Minutes (10979) 5   Physical Therapy Goals   PT Frequency 2x/day   PT Predicted Duration/Target Date for Goal Attainment 03/08/24   PT Goals Bed Mobility;Transfers;Gait;Stairs   PT: Bed Mobility Supervision/stand-by assist;Supine to/from sit;Rolling;Bridging;Within precautions   PT: Transfers Supervision/stand-by assist;Sit to/from stand;Assistive device;Within precautions   PT: Gait Supervision/stand-by assist;Rolling walker;Within precautions;100 feet    Interventions   Interventions Quick Adds Gait Training;Therapeutic Activity;Therapeutic Procedure   Therapeutic Procedure/Exercise   Ther. Procedure: strength, endurance, ROM, flexibillity Minutes (94206) 8   Symptoms Noted During/After Treatment increased pain   Treatment Detail/Skilled Intervention Educated on post surgical benefits of TE on circulation, functional ROM, and strength. Left pt with TE handout. With pt in supine cued for  glute sets x 10, AP's x 30, on LLE: quad sets x 10, SLR x 10. Pt tolerated all TE well. Educated on pain tolerance scale and to not push exercises to higher levels of pain   Therapeutic Activity   Therapeutic Activities: dynamic activities to improve functional performance Minutes (94334) 5   Symptoms Noted During/After Treatment Fatigue   Treatment Detail/Skilled Intervention Greeted pt supine in bed. Per convo with nursing and pt, Surgeon had just come up to see patient after surgery, apparently pt lost more than normal blood, was fatigued, and surgeon recommended no OOB activity tonight so therapist provided education and started TE. Educated on orders for WBAT, and KI to be on at all times for 1 month per surgeon order. Answered pt questions about normal course of rehab, and balancing activity with rest during rehab.   Total Session Time   Timed Code Treatment Minutes 13   Total Session Time (sum of timed and untimed services) 18

## 2024-03-08 NOTE — PLAN OF CARE
Goal Outcome Evaluation:    Other Info: Trauma/Ortho/Medical (Choose one) ortho     Diagnosis: L TKA rotating hinge revision  POD#: 1  Mental Status: a/O x4  Activity:   Diet: Regular  Pain: Tramadol given  Rowland/Voiding: purewick. Bladder scan for 953 ml.  Straight cathed with 1100 ml output.  Tele/Restraints/Iso: NA  02/LDA: 2 lpm NC / PIV sl  D/C Date: TBD

## 2024-03-08 NOTE — CONSULTS
"[facility]  WO Nurse Inpatient Assessment     Consulted for: R buttock    Summary: Resurfaced epidermis to inner right buttock. Prevention orders entered. [facility] nurse signing off.    Patient History (according to provider note(s):      \"History of s/p bariatric surgery, BMI 41, depression, dyslipidemia, HTN, GERD, chronic pain, history of gastritis, osteoporosis.\"     left knee arthroplasty rotating hinge revision, Left - Knee 3/7/24    Assessment:      Areas visualized during today's visit: Sacrum/coccyx and buttocks    Patient reports she had a skin injury to inner right buttock PTA due to her brief rubbing against her skin during exercise. No wound noted by writer. Resurfaced, pink epidermis present with some flakiness.      Treatment Plan:     Buttock wound(s) prevention: BID and PRN with hygiene or incontinence   After cleansing, apply a thin layer of Critic-aid barrier paste to inner buttocks.  No brief in bed.  Minimize linen under patient when she is in bed.  Limit time up in chair to one hour.     Orders: Written    RECOMMEND PRIMARY TEAM ORDER: None, at this time  Education provided: plan of care, wound progress, Moisture management, and Hygiene  Discussed plan of care with: Patient, Family, and Nurse  [facility] nurse follow-up plan: signing off  Notify [facility] if wound(s) deteriorate.  Nursing to notify the Provider(s) and re-consult the [facility] Nurse if new skin concern.    DATA:     Current support surface: Standard  Standard gel/foam mattress (IsoFlex, Atmos air, etc)  Containment of urine/stool: Continent of bladder and Continent of bowel  BMI: Body mass index is 38.44 kg/m .   Active diet order: Orders Placed This Encounter      Advance Diet as Tolerated: Regular Diet Adult     Output: I/O last 3 completed shifts:  In: 3006.69 [I.V.:2506.69]  Out: 2775 [Urine:1775; Blood:1000]     Labs:   Recent Labs   Lab 03/08/24  0740   HGB 8.3*     Pressure injury risk assessment:   Sensory Perception: " "4-->no impairment  Moisture: 4-->rarely moist  Activity: 1-->bedfast  Mobility: 3-->slightly limited  Nutrition: 3-->adequate  Friction and Shear: 3-->no apparent problem  Randy Score: 18    Anastasia Cee RN, CWOCN  Please contact via Resolver at name or group \"[facility] nurse\"- M-F 8A-4P  Leave  @ [phone] for non-urgent needs. Checked occasionally M-F.   "

## 2024-03-09 ENCOUNTER — APPOINTMENT (OUTPATIENT)
Dept: PHYSICAL THERAPY | Facility: CLINIC | Age: 79
DRG: 467 | End: 2024-03-09
Attending: ORTHOPAEDIC SURGERY
Payer: COMMERCIAL

## 2024-03-09 ENCOUNTER — APPOINTMENT (OUTPATIENT)
Dept: OCCUPATIONAL THERAPY | Facility: CLINIC | Age: 79
DRG: 467 | End: 2024-03-09
Attending: ORTHOPAEDIC SURGERY
Payer: COMMERCIAL

## 2024-03-09 VITALS
DIASTOLIC BLOOD PRESSURE: 56 MMHG | HEART RATE: 89 BPM | RESPIRATION RATE: 16 BRPM | WEIGHT: 217 LBS | OXYGEN SATURATION: 97 % | SYSTOLIC BLOOD PRESSURE: 109 MMHG | TEMPERATURE: 102.5 F | HEIGHT: 63 IN | BODY MASS INDEX: 38.45 KG/M2

## 2024-03-09 LAB
GLUCOSE SERPL-MCNC: 131 MG/DL (ref 70–99)
HGB BLD-MCNC: 8.5 G/DL (ref 11.7–15.7)

## 2024-03-09 PROCEDURE — 97116 GAIT TRAINING THERAPY: CPT | Mod: GP

## 2024-03-09 PROCEDURE — 250N000013 HC RX MED GY IP 250 OP 250 PS 637: Performed by: PHYSICIAN ASSISTANT

## 2024-03-09 PROCEDURE — 85018 HEMOGLOBIN: CPT | Performed by: PHYSICIAN ASSISTANT

## 2024-03-09 PROCEDURE — 97535 SELF CARE MNGMENT TRAINING: CPT | Mod: GO

## 2024-03-09 PROCEDURE — 99207 PR NO BILLABLE SERVICE THIS VISIT: CPT | Performed by: INTERNAL MEDICINE

## 2024-03-09 PROCEDURE — 82947 ASSAY GLUCOSE BLOOD QUANT: CPT | Performed by: ORTHOPAEDIC SURGERY

## 2024-03-09 PROCEDURE — 97530 THERAPEUTIC ACTIVITIES: CPT | Mod: GP

## 2024-03-09 PROCEDURE — 0QP904Z REMOVAL OF INTERNAL FIXATION DEVICE FROM LEFT FEMORAL SHAFT, OPEN APPROACH: ICD-10-PCS | Performed by: ORTHOPAEDIC SURGERY

## 2024-03-09 PROCEDURE — 0SPD0JZ REMOVAL OF SYNTHETIC SUBSTITUTE FROM LEFT KNEE JOINT, OPEN APPROACH: ICD-10-PCS | Performed by: ORTHOPAEDIC SURGERY

## 2024-03-09 PROCEDURE — 0SRD0J9 REPLACEMENT OF LEFT KNEE JOINT WITH SYNTHETIC SUBSTITUTE, CEMENTED, OPEN APPROACH: ICD-10-PCS | Performed by: ORTHOPAEDIC SURGERY

## 2024-03-09 PROCEDURE — 36415 COLL VENOUS BLD VENIPUNCTURE: CPT | Performed by: ORTHOPAEDIC SURGERY

## 2024-03-09 PROCEDURE — 97110 THERAPEUTIC EXERCISES: CPT | Mod: GP

## 2024-03-09 RX ADMIN — B-COMPLEX W/ C & FOLIC ACID TAB 1 TABLET: TAB at 08:37

## 2024-03-09 RX ADMIN — APIXABAN 2.5 MG: 2.5 TABLET, FILM COATED ORAL at 08:37

## 2024-03-09 RX ADMIN — PANTOPRAZOLE SODIUM 40 MG: 40 TABLET, DELAYED RELEASE ORAL at 08:37

## 2024-03-09 RX ADMIN — BACLOFEN 10 MG: 10 TABLET ORAL at 08:37

## 2024-03-09 RX ADMIN — FLUOXETINE HYDROCHLORIDE 40 MG: 20 CAPSULE ORAL at 08:37

## 2024-03-09 RX ADMIN — AZELASTINE 1 SPRAY: 1 SPRAY, METERED NASAL at 08:40

## 2024-03-09 RX ADMIN — CETIRIZINE HYDROCHLORIDE 10 MG: 10 TABLET, FILM COATED ORAL at 08:37

## 2024-03-09 RX ADMIN — TRAMADOL HYDROCHLORIDE 50 MG: 50 TABLET, COATED ORAL at 12:02

## 2024-03-09 RX ADMIN — ACETAMINOPHEN 975 MG: 325 TABLET, FILM COATED ORAL at 08:36

## 2024-03-09 ASSESSMENT — ACTIVITIES OF DAILY LIVING (ADL)
ADLS_ACUITY_SCORE: 34
ADLS_ACUITY_SCORE: 30
ADLS_ACUITY_SCORE: 34
ADLS_ACUITY_SCORE: 30
ADLS_ACUITY_SCORE: 34
ADLS_ACUITY_SCORE: 34
ADLS_ACUITY_SCORE: 30
ADLS_ACUITY_SCORE: 30
ADLS_ACUITY_SCORE: 34
ADLS_ACUITY_SCORE: 34
ADLS_ACUITY_SCORE: 30
ADLS_ACUITY_SCORE: 34
ADLS_ACUITY_SCORE: 34
ADLS_ACUITY_SCORE: 30
ADLS_ACUITY_SCORE: 34
ADLS_ACUITY_SCORE: 34

## 2024-03-09 NOTE — PLAN OF CARE
Goal Outcome Evaluation:      Plan of Care Reviewed With: patient, child    Overall Patient Progress: improvingOverall Patient Progress: improving    Outcome Evaluation: Discharge to Home 03/09/2024 w/ Family.  Voiding adequately this shift w 2x BM's.  Pain well-managed.    Patient vital signs are at baseline: Yes  Patient able to ambulate as they were prior to admission or with assist devices provided by therapies during their stay:  Yes  Patient MUST void prior to discharge:  Yes  Patient able to tolerate oral intake:  Yes  Pain has adequate pain control using Oral analgesics:  Yes  Does patient have an identified :  Yes  Has goal D/C date and time been discussed with patient:  Yes   Discharge to Home 03/09/2024 w/ Family.    Dx:Left TKA  POD#2    A&Ox4.   CMS Intact.   VSS on RA.   Up with SBA GB and walker.   Voiding in BR.   PIV Access Removed for Discharge.  Regular Diet.  Pain controlled with Tramadol, Tylenol.   Continue to monitor.       Reviewed discharge instructions and medications with patient and daughter:YES  Questions answered:YES  Patient discharged to:Home w/ Family  All belongs discharged with patient:YES

## 2024-03-09 NOTE — PROGRESS NOTES
[facility]  Hospitalist Progress Note  Jerry Christensen MD  03/09/2024    Assessment & Plan   Ms. Kenya Porter is a 77 yo female with history including asthma, non-obstructive CAD, DLD, SAIRA, depression/anxiety, OA, OP, GERD and h/o skin cancer; with recent periprosthetic left femur dx, who underwent revision L TKA/distal femoral replacement on 3/7/2024. IM consulted for medical co-management.     Periprosthetic left femur fracture - s/p removal hardware left femur, ORIF left femur,revision total knee arthroplasty to a rotating hinge, on 3/7/2024.  - Post-op management per Ortho.  - Continue acetaminophen scheduled, baclofen scheduled; gabapentin scheduled; PRN acetaminophen; PRN hydroxyzine; PRN tramadol; minimize opioids as able.  - Continue therapies.  - Continue apixaban for prophylaxis per Ortho.    Post op fever   - per RN, patient was discharged by orthopedics after patient received tylenol and fever resolved, I did not see the patient.     Blood loss anemia.  Dizziness, suspect related to above.  * Hgb 11.1 prior to surgery 3/7. Started PO iron after surgery 3/7.  * On 3/8, hgb 8.3. Pt a bit dizzy with BP's lower than baseline, but subsequently feeling OK.         Recent Labs   Lab 03/09/24 0719  03/08/24  0740 03/07/24  1405   HGB 8.5 8.3* 11.1*   - Continue to monitor CBC per Ortho.       Asthma.  Allergies.  - Continue azelastine nasal, cetirizine, montelukast and PRN albuterol.     CAD, nonobstructive.  DLD.  Mild AS.  Chronic edema.  H/o junctional tachycardia.  * Pt was seen by Cardiology 11/2023 for abnormal stress test; also had h/o chest pain relieved with NTG. Found with moderate coronary calcifications on CT screening. Overall, felt by Cardiology that pt unlikely had obstructive CAD.   - Continue atorvastatin, ezetimibe, PRN NTG.  - Continue PRN HCTZ for swelling.     SAIRA.  - Continue CPAP.     Depression/anxiety.  - Continue fluoxetine.     GERD.  - Continue  "pantoprazole.        Clinically Significant Risk Factors                          # Obesity: Estimated body mass index is 38.44 kg/m  as calculated from the following:    Height as of this encounter: 1.6 m (5' 3\").    Weight as of this encounter: 98.4 kg (217 lb)., PRESENT ON ADMISSION     # Asthma: noted on problem list        COVID-19 testing.  COVID-19 PCR Results              No data to display                COVID-19 Antibody Results, Testing for Immunity              No data to display                   Diet: Advance Diet as Tolerated: Regular Diet Adult    Prophylaxis: PCD's, ambulation. Apixaban per Ortho.  Rowland Catheter: Not present  Lines: None     Code Status: Full Code        Disposition Plan      Disposition:     Interval History   -- chart reviewed  -- noted fever overnight, up to 102.5  -- UA negative    -Data reviewed today: I reviewed all new labs and imaging over the last 24 hours. I personally reviewed no images or EKG's today.    Physical Exam    , Blood pressure 109/56, pulse 89, temperature (!) 102.5  F (39.2  C), temperature source Oral, resp. rate 16, height 1.6 m (5' 3\"), weight 98.4 kg (217 lb), SpO2 97%.  Vitals:    03/07/24 0759   Weight: 98.4 kg (217 lb)     Vital Signs with Ranges  Temp:  [99  F (37.2  C)-102.5  F (39.2  C)] 102.5  F (39.2  C)  Pulse:  [87-91] 89  Resp:  [16] 16  BP: ()/(53-56) 109/56  SpO2:  [93 %-97 %] 97 %  I/O's Last 24 hours  I/O last 3 completed shifts:  In: -   Out: 2250 [Urine:2250]    Constitutional: Patient not seen and was discharged by orthopedics  Respiratory:    Cardiovascular:    GI:    Skin/Integumen:    Other:      Medications   All medications were reviewed.   lactated ringers 75 mL/hr at 03/07/24 1836      acetaminophen  975 mg Oral TID    apixaban ANTICOAGULANT  2.5 mg Oral BID    atorvastatin  40 mg Oral QPM    azelastine  1 spray Both Nostrils BID    baclofen  10 mg Oral BID    calcium carbonate  600 mg Oral QPM    calcium polycarbophil  " 1,250 mg Oral QPM    cetirizine  10 mg Oral Daily    ezetimibe  10 mg Oral QPM    ferrous sulfate  325 mg Oral QPM    FLUoxetine  40 mg Oral QAM    gabapentin  100 mg Oral At Bedtime    montelukast  10 mg Oral At Bedtime    pantoprazole  40 mg Oral Daily    polyethylene glycol  17 g Oral Daily    selenium  50 mcg Oral QPM    senna-docusate  1 tablet Oral BID    sodium chloride (PF)  3 mL Intracatheter Q8H    vitamin B complex with vitamin C  1 tablet Oral QPM    vitamin C  1,000 mg Oral QPM    Vitamin D3  25 mcg Oral QPM        Data   Recent Labs   Lab 03/09/24  0719 03/08/24  0740 03/07/24  1405 03/07/24  0744   HGB 8.5* 8.3* 11.1*  --    POTASSIUM  --   --   --  4.5   CR  --   --   --  0.77   * 117*  --  98       No results found for this or any previous visit (from the past 24 hour(s)).    Jerry Christensen MD  Text Page  (7am to 6pm)

## 2024-03-09 NOTE — PLAN OF CARE
Physical Therapy Discharge Summary    Reason for therapy discharge:    All IPPT goals met.     Progress towards therapy goal(s). See goals on Care Plan in [facility] electronic health record for goal details.  Goals met    Therapy recommendation(s):    Continued therapy is recommended.  Rationale/Recommendations:  Pt has met all IPPT goals and is safe to DC home with assist and OPPT. Pt continues to have dizziness but is able to manage and complete moblity safely with CGA.

## 2024-03-09 NOTE — PLAN OF CARE
Date/Time 3/8/2024    Trauma/Ortho/Medical (Choose one)  Ortho.    Diagnosis: Left total knee Arthroplasty with rotating hinge revision.  POD#: 1  Mental Status: A&OX4.  Activity/dangle Assist of 1 with gait belt and walker.  Diet: Regular.  Pain: Denied pain.  Rowland/Voiding: in the bathroom.  Tele/Restraints/Iso: None.  02/LDA: PIV SL, room air.  D/C Date: Pending.  Other Info:  pt had temp 100.6 F  provider notified.

## 2024-03-09 NOTE — PLAN OF CARE
"  Problem: Adult Inpatient Plan of Care  Goal: Plan of Care Review  Description: The Plan of Care Review/Shift note should be completed every shift.  The Outcome Evaluation is a brief statement about your assessment that the patient is improving, declining, or no change.  This information will be displayed automatically on your shift  note.  Outcome: Progressing  Goal: Patient-Specific Goal (Individualized)  Description: You can add care plan individualizations to a care plan. Examples of Individualization might be:  \"Parent requests to be called daily at 9am for status\", \"I have a hard time hearing out of my right ear\", or \"Do not touch me to wake me up as it startles  me\".  Outcome: Progressing  Goal: Absence of Hospital-Acquired Illness or Injury  Outcome: Progressing  Intervention: Identify and Manage Fall Risk  Recent Flowsheet Documentation  Taken 3/9/2024 0000 by Andres Cabrera RN  Safety Promotion/Fall Prevention: safety round/check completed  Intervention: Prevent Skin Injury  Recent Flowsheet Documentation  Taken 3/9/2024 0349 by Andres Cabrera RN  Body Position:   turned   right   left  Taken 3/9/2024 0200 by Andres Cabrera RN  Body Position:   heels elevated   supine  Taken 3/9/2024 0000 by Andres Cabrera RN  Body Position:   heels elevated   turned   left  Goal: Optimal Comfort and Wellbeing  Outcome: Progressing  Intervention: Monitor Pain and Promote Comfort  Recent Flowsheet Documentation  Taken 3/8/2024 2355 by Andres Cabrera RN  Pain Management Interventions:   medication (see MAR)   cold applied  Goal: Readiness for Transition of Care  Outcome: Progressing     Problem: Risk for Delirium  Goal: Optimal Coping  Outcome: Progressing  Goal: Improved Behavioral Control  Outcome: Progressing  Intervention: Minimize Safety Risk  Recent Flowsheet Documentation  Taken 3/9/2024 0000 by Andres Cabrera RN  Enhanced Safety Measures: room near unit station  Goal: " Improved Attention and Thought Clarity  Outcome: Progressing  Goal: Improved Sleep  Outcome: Progressing     Problem: Pain Acute  Goal: Optimal Pain Control and Function  Outcome: Progressing  Intervention: Develop Pain Management Plan  Recent Flowsheet Documentation  Taken 3/8/2024 2355 by Andres Cabrera RN  Pain Management Interventions:   medication (see MAR)   cold applied  Intervention: Prevent or Manage Pain  Recent Flowsheet Documentation  Taken 3/9/2024 0000 by Andres Cabrera RN  Medication Review/Management: medications reviewed     Problem: Infection  Goal: Absence of Infection Signs and Symptoms  Outcome: Progressing     Problem: Knee Arthroplasty  Goal: Optimal Coping  Outcome: Progressing  Goal: Absence of Bleeding  Outcome: Progressing  Goal: Effective Bowel Elimination  Outcome: Progressing  Goal: Fluid and Electrolyte Balance  Outcome: Progressing  Goal: Optimal Functional Ability  Outcome: Progressing  Intervention: Promote Optimal Functional Status  Recent Flowsheet Documentation  Taken 3/9/2024 0000 by Andres Cabrera RN  Activity Management: activity adjusted per tolerance  Goal: Absence of Infection Signs and Symptoms  Outcome: Progressing  Goal: Intact Neurovascular Status  Outcome: Progressing  Goal: Anesthesia/Sedation Recovery  Outcome: Progressing  Intervention: Optimize Anesthesia Recovery  Recent Flowsheet Documentation  Taken 3/9/2024 0000 by Andres Cabrera RN  Safety Promotion/Fall Prevention: safety round/check completed  Goal: Optimal Pain Control and Function  Outcome: Progressing  Intervention: Prevent or Manage Pain  Recent Flowsheet Documentation  Taken 3/8/2024 2355 by Andres Cabrera RN  Pain Management Interventions:   medication (see MAR)   cold applied  Goal: Nausea and Vomiting Relief  Outcome: Progressing  Goal: Effective Urinary Elimination  Outcome: Progressing  Goal: Effective Oxygenation and Ventilation  Outcome:  Progressing  Intervention: Optimize Oxygenation and Ventilation  Recent Flowsheet Documentation  Taken 3/9/2024 0349 by Andres Cabrera RN  Head of Bed (HOB) Positioning: HOB flat  Taken 3/9/2024 0200 by Andres Cabrera RN  Head of Bed (HOB) Positioning: HOB at 15 degrees  Taken 3/9/2024 0000 by Andres Cabrera RN  Head of Bed (HOB) Positioning: HOB flat   Goal Outcome Evaluation:         Date/Time: 3/8/24 @ 4030-2939    Trauma/Ortho/Medical (Choose one) Ortho    Diagnosis: L knee arthroplasty rotating hinge revision  POD#: 2  Mental Status: Alert and oriented x 4  Activity/dangle Assist of 1 with GB and walker  Diet:Regular  Pain: Managed with PRN Tramadol and Atarax  Rowland/Voiding: External catheter  Tele/Restraints/Iso: N/A  02/LDA: On room air. PIV SL on the L lower forearm  D/C Date: TBD  Other Info: CMS intact. Dressing CDI. Knee immobiizer on at all times.

## 2024-03-09 NOTE — PLAN OF CARE
Occupational Therapy Discharge Summary    Reason for therapy discharge:    All goals and outcomes met, no further needs identified.    Progress towards therapy goal(s). See goals on Care Plan in [facility] electronic health record for goal details.  Goals met    Therapy recommendation(s):    No further therapy is recommended. Pt is currently functioning below baseline as expected following L TKA. Pt lives alone in ILF and reports being IND w/ all I/ADL's at baseline prior to admission. Anticipate pt will be safe to d/c home w/ assist from family.

## 2024-03-09 NOTE — PROGRESS NOTES
Pt has low grade fever 100.6   Expected post op     Ordered UA and BC   Hold on abx low threshold to start if unstable .

## 2024-03-12 LAB — BACTERIA TISS BX CULT: NO GROWTH

## 2024-03-14 LAB
BACTERIA BLD CULT: NO GROWTH
BACTERIA BLD CULT: NO GROWTH
BACTERIA TISS BX CULT: ABNORMAL

## 2024-04-03 NOTE — DISCHARGE SUMMARY
Orthopedic Discharge Summary   Patient: Kenya Porter  Admission Date: 3/7/2024  Discharge Date: 3/9/2024  Date of Service: 4/3/2024  Attending Provider: No att. providers found  Admission Diagnosis: Periprosthetic fracture around internal prosthetic left knee joint [M97.12XA]  Broken internal left knee prosthesis (H24) [T84.013A]  Mechanical loosening of internal left knee prosthetic joint (H24) [T84.033A]  S/P revision of total knee, left [Z96.652]  Discharge Diagnosis: left knee failed hardware  Procedures Performed: revision left total knee replacement  Complications: None apparent   History of Present Illness: see operative report for full HPI  Past Medical History:   Past Medical History:   Diagnosis Date    Anxiety     Chronic superficial gastritis with bleeding     Depression     Depression, major, single episode, mild (H24)     Hyperlipemia     Hypothyroidism     Loose stools     Major depressive disorder, single episode, moderate degree (H)     Mild intermittent asthma without complication     Morbid obesity with body mass index of 40.0-49.9 (H)     Obstructive sleep apnea     Osteoarthritis of lumbar spine     Osteoarthritis of lumbar spine     Osteoporosis     Pain in buttock     Pain in joint, multiple sites     Skin cancer     Skin cancer of nose     Sleep deficient     Vitamin D deficiency      Patient Active Problem List   Diagnosis    Right buttock pain    Anxiety    Chronic superficial gastritis with bleeding    Depression    Depression, major, single episode, mild (H24)    Depression, major, single episode, moderate (H)    History of bariatric surgery    Hyperlipidemia, unspecified    Hypothyroidism    Loose stools    Malignant neoplasm of skin    Mild intermittent asthma without complication    Obesity, Class III, BMI 40-49.9 (morbid obesity) (H)    Obstructive sleep apnea    Osteoarthritis of lumbar spine    Osteoporosis    Pain, joint, multiple sites    Skin cancer of nose    Sleep  deficient    Vitamin D deficiency    Pain in buttock    S/P revision of total knee, left     Past Surgical History:   Procedure Laterality Date    ARTHROPLASTY REVISION KNEE Left 3/7/2024    Procedure: left knee arthroplasty rotating hinge revision;  Surgeon: Federica Das MD;  Location: [facility] OR    AS TOTAL KNEE ARTHROPLASTY Left     BARIATRIC SURGERY  2001    CARPAL TUNNEL RELEASE RT/LT Bilateral     CHOLECYSTECTOMY      COLONOSCOPY      femur fracture treatment Left     HEMIARTHROPLASTY SHOULDER FRACTURE Left     JOINT REPLACEMENT, HIP RT/LT Right     PANNICULECTOMY      refractictive surgery      RELEASE TRIGGER FINGER Right     3re and 4th finger    REVERSE TOTAL SHOULDER ARTHROPLASTY      herve-en-y procedure  2014    GWENDOLYN and BSO      tearduct stent      tonsil and adenoidectomy       Social History     Socioeconomic History    Marital status: Single     Spouse name: Not on file    Number of children: Not on file    Years of education: Not on file    Highest education level: Not on file   Occupational History    Not on file   Tobacco Use    Smoking status: Former     Types: Cigarettes    Smokeless tobacco: Never    Tobacco comments:     Quit 50 years ago   Vaping Use    Vaping Use: Never used   Substance and Sexual Activity    Alcohol use: Never     Comment: quit 8 ago    Drug use: Never    Sexual activity: Not on file   Other Topics Concern    Not on file   Social History Narrative    Not on file     Social Determinants of Health     Financial Resource Strain: Not on file   Food Insecurity: Not on file   Transportation Needs: Not on file   Physical Activity: Not on file   Stress: Not on file   Social Connections: Not on file   Interpersonal Safety: Not on file   Housing Stability: Not on file     Medications on admission:   No medications prior to admission.     Allergies:    Allergies   Allergen Reactions    Hydromorphone Other (See Comments) and Palpitations     cardiomyopathy    Trazodone Other (See  Comments)     Cardiac changes in EKG    EKG changes    Zolpidem      Other Reaction(s): Behavioral Disturbances, Delirium    Adhesive Tape      Paper tape  Contact dermatitis    Benzalkonium Chloride Itching     Erythema    In eye gtts only    Fluticasone Other (See Comments)     Sinus polyps    Hydrocodone Itching     Restless feeling    Robaxin [Methocarbamol] Headache    Codeine Anxiety and Unknown    Hydrocodone-Acetaminophen Itching and Rash    Keflex [Cephalexin] Rash    Oxycodone Itching and Rash    Tetracycline Rash       Hospital Course: Patient was admitted to Orthopedics and brought to the OR on  where she underwent the above named procedure. Postoperatively she did very well with no apparent complications, and she had an uneventful hospital stay. Antibiotics were given for 24 hours after surgery. She was given aspirin for DVT prophylaxis and her pain was well controlled with oral meds, then transitioned to oral pain medications during her hospital stay. She progressed well with physical therapy and discharge to home was recommended. Her chronic medical problems were followed by the medicine team during her hospital stay and there were no significant changes to conditions or treatment plans. No new medical problems presented during her hospital stay.   Consultations Obtained During Hospitalization:  1. Internal medicine for management of comorbid conditions and home medication management.  2. Physical Therapy for gait training, mobilization, range of motion and strengthening exercises  3. Occupational Therapy for activities of daily living.  4. Social Work for disposition planning.  Principal Problem:    S/P revision of total knee, left    Discharge Disposition: patient improving  Discharge Diet: resume normal pre op diet  Discharge Medications:   Discharge Medication List as of 3/9/2024  2:44 PM        START taking these medications    Details   apixaban ANTICOAGULANT (ELIQUIS) 2.5 MG tablet Take 1  tablet (2.5 mg) by mouth 2 times daily, Disp-60 tablet, R-0, Local Print      hydrOXYzine HCl (ATARAX) 10 MG tablet Take 1 tablet (10 mg) by mouth every 6 hours as needed for other (adjuvant pain), Disp-30 tablet, R-0, Local Print      ondansetron (ZOFRAN ODT) 4 MG ODT tab Take 1 tablet (4 mg) by mouth every 6 hours as needed for nausea or vomiting, Disp-10 tablet, R-0, Local Print      senna-docusate (SENOKOT-S/PERICOLACE) 8.6-50 MG tablet Take 1 tablet by mouth 2 times daily, Disp-30 tablet, R-0, Local Print           CONTINUE these medications which have CHANGED    Details   traMADol (ULTRAM) 50 MG tablet Take 1 tablet (50 mg) by mouth every 6 hours as needed for moderate pain, Disp-30 tablet, R-0, Local Print           CONTINUE these medications which have NOT CHANGED    Details   acetaminophen (TYLENOL) 500 MG tablet Take 500 mg by mouth at bedtime Max dose 4000mg in 24 hours, Historical      albuterol (PROAIR HFA/PROVENTIL HFA/VENTOLIN HFA) 108 (90 Base) MCG/ACT inhaler Inhale 2 puffs into the lungs every 4 hours as needed for shortness of breath, wheezing or cough, Historical      amoxicillin (AMOXIL) 500 MG capsule Take 4 capsules by mouth once Once 1 hour before dental procedure., Historical      atorvastatin (LIPITOR) 40 MG tablet Take 40 mg by mouth daily, Historical      azelastine (ASTELIN) 0.1 % nasal spray Spray 1 spray into both nostrils 2 times daily, Historical      baclofen (LIORESAL) 10 MG tablet Take 10 mg by mouth 2 times daily Prn muscle spasms, Historical      calcium carbonate (OS-CARL) 1500 (600 Ca) MG tablet Take 1 tablet by mouth daily, Historical      calcium polycarbophil (FIBERCON) 625 MG tablet Take 2 tablets by mouth, Historical      cetirizine (ZYRTEC) 10 MG tablet Take 10 mg by mouth daily, Historical      cyanocobalamin (CYANOCOBALAMIN) 1000 MCG/ML injection Inject 1 mL into the muscle every 30 days, Historical      cyclobenzaprine (FLEXERIL) 10 MG tablet Take 10 mg by mouth  nightly as needed for muscle spasms, Historical      diphenhydrAMINE (BENADRYL) 25 MG capsule Take 25 mg by mouth nightly as needed for itching or allergies, Historical      ezetimibe (ZETIA) 10 MG tablet Take 10 mg by mouth daily, Historical      Ferrous Sulfate 324 (65 Fe) MG TBEC Take 1 tablet by mouth daily, Historical      FLUoxetine (PROZAC) 40 MG capsule Take 40 mg by mouth every morning, Historical      hydrochlorothiazide (HYDRODIURIL) 25 MG tablet Take 25 mg by mouth daily, Historical      loperamide (IMODIUM) 2 MG capsule Take 2 mg by mouth 4 times daily as needed for diarrhea, Historical      montelukast (SINGULAIR) 10 MG tablet Take 10 mg by mouth at bedtime, Historical      !! multiple vitamin  tablet TABS Take 1 tablet by mouth daily, Historical      naproxen (NAPROSYN) 500 MG tablet Take 250 mg by mouth 2 times daily (with meals) 0.5 X 500mg = 250mg, Historical      nitroGLYcerin (NITROSTAT) 0.4 MG sublingual tablet Place 0.4 mg under the tongue every 5 minutes as needed for chest pain For chest pain place 1 tablet under the tongue every 5 minutes for 3 doses. If symptoms persist 5 minutes after 1st dose call 911., Historical      NONFORMULARY Protein powder   minx in liquid daily, Historical      omeprazole (PRILOSEC) 40 MG DR capsule Take 40 mg by mouth daily, Historical      oxymetazoline (AFRIN) 0.05 % nasal spray Spray 2 sprays into both nostrils 2 times daily, Historical      selenium 50 MCG TABS tablet Take 50 mcg by mouth daily, Historical      !! vitamin B complex with vitamin C (VITAMIN  B COMPLEX) tablet Take 1 tablet by mouth daily, Historical      vitamin C (ASCORBIC ACID) 1000 MG TABS Take 1,000 mg by mouth daily, Historical      Vitamin D3 (VITAMIN D, CHOLECALCIFEROL,) 25 mcg (1000 units) tablet Take 1 tablet by mouth daily, Historical       !! - Potential duplicate medications found. Please discuss with provider.        STOP taking these medications       aspirin (ASA) 81 MG chewable  tablet Comments:   Reason for Stopping:             Code Status:   X-rays needed at the follow up visit:   Lamine Burgos PA-C  4/3/2024 5:42 PM   JO[facility]  [place]  [phone]

## 2024-12-21 ENCOUNTER — HEALTH MAINTENANCE LETTER (OUTPATIENT)
Age: 79
End: 2024-12-21

## 2025-08-18 ENCOUNTER — TRANSFERRED RECORDS (OUTPATIENT)
Dept: MULTI SPECIALTY CLINIC | Facility: CLINIC | Age: 80
End: 2025-08-18
Payer: COMMERCIAL

## 2025-08-18 LAB
CHOLESTEROL (EXTERNAL): 139 MG/DL
CREATININE (EXTERNAL): 0.76 MG/DL (ref 0.6–1)
GFR ESTIMATED (EXTERNAL): 80 ML/MIN/1.73M2
GLUCOSE (EXTERNAL): 83 MG/DL (ref 65–99)
HBA1C MFR BLD: 6 %
HDLC SERPL-MCNC: 58 MG/DL
LDL CHOLESTEROL CALCULATED (EXTERNAL): 65 MG/DL
NON HDL CHOLESTEROL (EXTERNAL): 81 MG/DL
POTASSIUM (EXTERNAL): 4.9 MMOL/L (ref 3.5–5.3)
TRIGLYCERIDES (EXTERNAL): 82 MG/DL

## 2025-08-29 RX ORDER — DULOXETIN HYDROCHLORIDE 20 MG/1
20 CAPSULE, DELAYED RELEASE ORAL EVERY MORNING
Status: ON HOLD | COMMUNITY

## 2025-08-29 RX ORDER — ASPIRIN 81 MG/1
81 TABLET ORAL AT BEDTIME
Status: ON HOLD | COMMUNITY

## 2025-08-29 RX ORDER — OMEPRAZOLE 20 MG/1
20 CAPSULE, DELAYED RELEASE ORAL AT BEDTIME
Status: ON HOLD | COMMUNITY

## 2025-08-29 RX ORDER — PYRIDOXINE HCL (VITAMIN B6) 25 MG
25 TABLET ORAL EVERY MORNING
Status: ON HOLD | COMMUNITY

## 2025-08-29 RX ORDER — LEVOTHYROXINE SODIUM 25 UG/1
25 TABLET ORAL
Status: ON HOLD | COMMUNITY

## 2025-09-04 ENCOUNTER — ANESTHESIA (OUTPATIENT)
Dept: SURGERY | Facility: CLINIC | Age: 80
End: 2025-09-04
Payer: COMMERCIAL

## 2025-09-04 ENCOUNTER — HOSPITAL ENCOUNTER (OUTPATIENT)
Facility: CLINIC | Age: 80
End: 2025-09-04
Attending: ORTHOPAEDIC SURGERY | Admitting: ORTHOPAEDIC SURGERY
Payer: COMMERCIAL

## 2025-09-04 ENCOUNTER — ANESTHESIA EVENT (OUTPATIENT)
Dept: SURGERY | Facility: CLINIC | Age: 80
End: 2025-09-04
Payer: COMMERCIAL

## 2025-09-04 VITALS
OXYGEN SATURATION: 97 % | HEART RATE: 60 BPM | WEIGHT: 232.8 LBS | RESPIRATION RATE: 14 BRPM | DIASTOLIC BLOOD PRESSURE: 29 MMHG | BODY MASS INDEX: 41.25 KG/M2 | TEMPERATURE: 98.7 F | SYSTOLIC BLOOD PRESSURE: 85 MMHG | HEIGHT: 63 IN

## 2025-09-04 DIAGNOSIS — Z96.652 S/P REVISION OF TOTAL KNEE, LEFT: Primary | ICD-10-CM

## 2025-09-04 PROBLEM — S72.142A INTERTROCHANTERIC FRACTURE OF LEFT FEMUR, CLOSED, INITIAL ENCOUNTER (H): Status: ACTIVE | Noted: 2025-09-04

## 2025-09-04 LAB
GLUCOSE SERPL-MCNC: 94 MG/DL (ref 70–99)
POTASSIUM SERPL-SCNC: 4.3 MMOL/L (ref 3.4–5.3)

## 2025-09-04 PROCEDURE — 258N000003 HC RX IP 258 OP 636: Performed by: ORTHOPAEDIC SURGERY

## 2025-09-04 PROCEDURE — 250N000009 HC RX 250: Performed by: ORTHOPAEDIC SURGERY

## 2025-09-04 PROCEDURE — 250N000011 HC RX IP 250 OP 636: Performed by: PHYSICIAN ASSISTANT

## 2025-09-04 PROCEDURE — 84132 ASSAY OF SERUM POTASSIUM: CPT | Performed by: ANESTHESIOLOGY

## 2025-09-04 PROCEDURE — 250N000025 HC SEVOFLURANE, PER MIN: Performed by: ORTHOPAEDIC SURGERY

## 2025-09-04 PROCEDURE — C1713 ANCHOR/SCREW BN/BN,TIS/BN: HCPCS | Performed by: ORTHOPAEDIC SURGERY

## 2025-09-04 PROCEDURE — 370N000017 HC ANESTHESIA TECHNICAL FEE, PER MIN: Performed by: ORTHOPAEDIC SURGERY

## 2025-09-04 PROCEDURE — 250N000013 HC RX MED GY IP 250 OP 250 PS 637: Performed by: NURSE PRACTITIONER

## 2025-09-04 PROCEDURE — 258N000001 HC RX 258: Performed by: ORTHOPAEDIC SURGERY

## 2025-09-04 PROCEDURE — C1762 CONN TISS, HUMAN(INC FASCIA): HCPCS | Performed by: ORTHOPAEDIC SURGERY

## 2025-09-04 PROCEDURE — 97116 GAIT TRAINING THERAPY: CPT | Mod: GP

## 2025-09-04 PROCEDURE — 250N000009 HC RX 250: Performed by: ANESTHESIOLOGY

## 2025-09-04 PROCEDURE — 272N000001 HC OR GENERAL SUPPLY STERILE: Performed by: ORTHOPAEDIC SURGERY

## 2025-09-04 PROCEDURE — 97530 THERAPEUTIC ACTIVITIES: CPT | Mod: GP

## 2025-09-04 PROCEDURE — 250N000013 HC RX MED GY IP 250 OP 250 PS 637: Performed by: PHYSICIAN ASSISTANT

## 2025-09-04 PROCEDURE — 258N000003 HC RX IP 258 OP 636: Performed by: ANESTHESIOLOGY

## 2025-09-04 PROCEDURE — 999N000141 HC STATISTIC PRE-PROCEDURE NURSING ASSESSMENT: Performed by: ORTHOPAEDIC SURGERY

## 2025-09-04 PROCEDURE — 250N000011 HC RX IP 250 OP 636: Performed by: ORTHOPAEDIC SURGERY

## 2025-09-04 PROCEDURE — 360N000077 HC SURGERY LEVEL 4, PER MIN: Performed by: ORTHOPAEDIC SURGERY

## 2025-09-04 PROCEDURE — 250N000011 HC RX IP 250 OP 636: Performed by: ANESTHESIOLOGY

## 2025-09-04 PROCEDURE — 82947 ASSAY GLUCOSE BLOOD QUANT: CPT | Performed by: ANESTHESIOLOGY

## 2025-09-04 PROCEDURE — 250N000013 HC RX MED GY IP 250 OP 250 PS 637: Performed by: ORTHOPAEDIC SURGERY

## 2025-09-04 PROCEDURE — 710N000009 HC RECOVERY PHASE 1, LEVEL 1, PER MIN: Performed by: ORTHOPAEDIC SURGERY

## 2025-09-04 PROCEDURE — 97161 PT EVAL LOW COMPLEX 20 MIN: CPT | Mod: GP

## 2025-09-04 PROCEDURE — 36415 COLL VENOUS BLD VENIPUNCTURE: CPT | Performed by: ANESTHESIOLOGY

## 2025-09-04 PROCEDURE — 258N000003 HC RX IP 258 OP 636: Performed by: PHYSICIAN ASSISTANT

## 2025-09-04 RX ORDER — NITROGLYCERIN 0.4 MG/1
0.4 TABLET SUBLINGUAL EVERY 5 MIN PRN
Status: ACTIVE | OUTPATIENT
Start: 2025-09-04

## 2025-09-04 RX ORDER — PYRIDOXINE HCL (VITAMIN B6) 25 MG
25 TABLET ORAL EVERY MORNING
Status: DISPENSED | OUTPATIENT
Start: 2025-09-04

## 2025-09-04 RX ORDER — NALOXONE HYDROCHLORIDE 0.4 MG/ML
0.4 INJECTION, SOLUTION INTRAMUSCULAR; INTRAVENOUS; SUBCUTANEOUS
Status: ACTIVE | OUTPATIENT
Start: 2025-09-04

## 2025-09-04 RX ORDER — CYCLOBENZAPRINE HCL 10 MG
10 TABLET ORAL AT BEDTIME
Status: DISPENSED | OUTPATIENT
Start: 2025-09-04

## 2025-09-04 RX ORDER — FERROUS SULFATE 325(65) MG
324 TABLET ORAL DAILY
Status: DISCONTINUED | OUTPATIENT
Start: 2025-09-04 | End: 2025-09-04

## 2025-09-04 RX ORDER — LIDOCAINE 40 MG/G
CREAM TOPICAL
Status: DISCONTINUED | OUTPATIENT
Start: 2025-09-04 | End: 2025-09-04 | Stop reason: HOSPADM

## 2025-09-04 RX ORDER — ONDANSETRON 2 MG/ML
4 INJECTION INTRAMUSCULAR; INTRAVENOUS EVERY 6 HOURS PRN
Status: ACTIVE | OUTPATIENT
Start: 2025-09-04

## 2025-09-04 RX ORDER — CETIRIZINE HYDROCHLORIDE 10 MG/1
10 TABLET ORAL DAILY
Status: DISPENSED | OUTPATIENT
Start: 2025-09-04

## 2025-09-04 RX ORDER — TRAMADOL HYDROCHLORIDE 50 MG/1
50 TABLET ORAL EVERY 6 HOURS PRN
Status: ACTIVE | OUTPATIENT
Start: 2025-09-04

## 2025-09-04 RX ORDER — ASCORBIC ACID 500 MG
1000 TABLET ORAL AT BEDTIME
Status: DISPENSED | OUTPATIENT
Start: 2025-09-04

## 2025-09-04 RX ORDER — FENTANYL CITRATE 0.05 MG/ML
50 INJECTION, SOLUTION INTRAMUSCULAR; INTRAVENOUS
Refills: 0 | Status: COMPLETED | OUTPATIENT
Start: 2025-09-04 | End: 2025-09-04

## 2025-09-04 RX ORDER — MONTELUKAST SODIUM 10 MG/1
10 TABLET ORAL AT BEDTIME
Status: DISPENSED | OUTPATIENT
Start: 2025-09-04

## 2025-09-04 RX ORDER — CEFAZOLIN SODIUM/WATER 2 G/20 ML
2 SYRINGE (ML) INTRAVENOUS SEE ADMIN INSTRUCTIONS
Status: DISCONTINUED | OUTPATIENT
Start: 2025-09-04 | End: 2025-09-04 | Stop reason: HOSPADM

## 2025-09-04 RX ORDER — OXYMETAZOLINE HYDROCHLORIDE 0.05 G/100ML
2 SPRAY NASAL 2 TIMES DAILY PRN
Status: ACTIVE | OUTPATIENT
Start: 2025-09-04

## 2025-09-04 RX ORDER — ATORVASTATIN CALCIUM 40 MG/1
40 TABLET, FILM COATED ORAL AT BEDTIME
Status: DISPENSED | OUTPATIENT
Start: 2025-09-04

## 2025-09-04 RX ORDER — AZELASTINE 1 MG/ML
1 SPRAY, METERED NASAL 2 TIMES DAILY PRN
Status: ACTIVE | OUTPATIENT
Start: 2025-09-04

## 2025-09-04 RX ORDER — CEFAZOLIN SODIUM 2 G/50ML
2 SOLUTION INTRAVENOUS EVERY 8 HOURS
Status: DISPENSED | OUTPATIENT
Start: 2025-09-04 | End: 2025-09-05

## 2025-09-04 RX ORDER — ONDANSETRON 4 MG/1
4 TABLET, ORALLY DISINTEGRATING ORAL EVERY 30 MIN PRN
Status: DISCONTINUED | OUTPATIENT
Start: 2025-09-04 | End: 2025-09-04 | Stop reason: HOSPADM

## 2025-09-04 RX ORDER — BACLOFEN 10 MG/1
10 TABLET ORAL 2 TIMES DAILY PRN
Status: ACTIVE | OUTPATIENT
Start: 2025-09-04

## 2025-09-04 RX ORDER — ASPIRIN 81 MG/1
81 TABLET, CHEWABLE ORAL 2 TIMES DAILY
Status: DISPENSED | OUTPATIENT
Start: 2025-09-04

## 2025-09-04 RX ORDER — LIDOCAINE HYDROCHLORIDE 20 MG/ML
INJECTION, SOLUTION INFILTRATION; PERINEURAL PRN
Status: DISCONTINUED | OUTPATIENT
Start: 2025-09-04 | End: 2025-09-04

## 2025-09-04 RX ORDER — AMOXICILLIN 250 MG
1 CAPSULE ORAL 2 TIMES DAILY
Status: DISPENSED | OUTPATIENT
Start: 2025-09-04

## 2025-09-04 RX ORDER — FENTANYL CITRATE 0.05 MG/ML
25 INJECTION, SOLUTION INTRAMUSCULAR; INTRAVENOUS EVERY 5 MIN PRN
Status: DISCONTINUED | OUTPATIENT
Start: 2025-09-04 | End: 2025-09-04 | Stop reason: HOSPADM

## 2025-09-04 RX ORDER — LIDOCAINE 40 MG/G
CREAM TOPICAL
Status: ACTIVE | OUTPATIENT
Start: 2025-09-04

## 2025-09-04 RX ORDER — NALOXONE HYDROCHLORIDE 0.4 MG/ML
0.2 INJECTION, SOLUTION INTRAMUSCULAR; INTRAVENOUS; SUBCUTANEOUS
Status: ACTIVE | OUTPATIENT
Start: 2025-09-04

## 2025-09-04 RX ORDER — HYDROXYZINE HYDROCHLORIDE 10 MG/1
10 TABLET, FILM COATED ORAL EVERY 6 HOURS PRN
Status: ACTIVE | OUTPATIENT
Start: 2025-09-04

## 2025-09-04 RX ORDER — ONDANSETRON 2 MG/ML
INJECTION INTRAMUSCULAR; INTRAVENOUS PRN
Status: DISCONTINUED | OUTPATIENT
Start: 2025-09-04 | End: 2025-09-04

## 2025-09-04 RX ORDER — OMEPRAZOLE 20 MG/1
20 CAPSULE, DELAYED RELEASE ORAL AT BEDTIME
Status: DISPENSED | OUTPATIENT
Start: 2025-09-04

## 2025-09-04 RX ORDER — OMEPRAZOLE 20 MG/1
40 CAPSULE, DELAYED RELEASE ORAL EVERY MORNING
Status: DISPENSED | OUTPATIENT
Start: 2025-09-04

## 2025-09-04 RX ORDER — NALOXONE HYDROCHLORIDE 0.4 MG/ML
0.1 INJECTION, SOLUTION INTRAMUSCULAR; INTRAVENOUS; SUBCUTANEOUS
Status: DISCONTINUED | OUTPATIENT
Start: 2025-09-04 | End: 2025-09-04 | Stop reason: HOSPADM

## 2025-09-04 RX ORDER — NAPROXEN 250 MG/1
250 TABLET ORAL 2 TIMES DAILY WITH MEALS
Status: DISPENSED | OUTPATIENT
Start: 2025-09-04

## 2025-09-04 RX ORDER — ONDANSETRON 4 MG/1
4 TABLET, ORALLY DISINTEGRATING ORAL EVERY 8 HOURS PRN
Qty: 10 TABLET | Refills: 0 | Status: SHIPPED | OUTPATIENT
Start: 2025-09-04

## 2025-09-04 RX ORDER — FERROUS SULFATE 325(65) MG
325 TABLET ORAL DAILY
Status: DISPENSED | OUTPATIENT
Start: 2025-09-04

## 2025-09-04 RX ORDER — LOPERAMIDE HYDROCHLORIDE 2 MG/1
2 CAPSULE ORAL 4 TIMES DAILY PRN
Status: ACTIVE | OUTPATIENT
Start: 2025-09-04

## 2025-09-04 RX ORDER — EZETIMIBE 10 MG/1
10 TABLET ORAL EVERY MORNING
Status: ACTIVE | OUTPATIENT
Start: 2025-09-05

## 2025-09-04 RX ORDER — SENNOSIDES 8.6 MG
325 CAPSULE ORAL DAILY
Qty: 30 TABLET | Refills: 0 | Status: SHIPPED | OUTPATIENT
Start: 2025-09-04

## 2025-09-04 RX ORDER — DULOXETIN HYDROCHLORIDE 20 MG/1
20 CAPSULE, DELAYED RELEASE ORAL EVERY MORNING
Status: ACTIVE | OUTPATIENT
Start: 2025-09-05

## 2025-09-04 RX ORDER — TRAMADOL HYDROCHLORIDE 50 MG/1
50 TABLET ORAL EVERY 6 HOURS PRN
Qty: 30 TABLET | Refills: 0 | Status: SHIPPED | OUTPATIENT
Start: 2025-09-04

## 2025-09-04 RX ORDER — GLYCOPYRROLATE 0.2 MG/ML
INJECTION, SOLUTION INTRAMUSCULAR; INTRAVENOUS PRN
Status: DISCONTINUED | OUTPATIENT
Start: 2025-09-04 | End: 2025-09-04

## 2025-09-04 RX ORDER — DEXAMETHASONE SODIUM PHOSPHATE 4 MG/ML
INJECTION, SOLUTION INTRA-ARTICULAR; INTRALESIONAL; INTRAMUSCULAR; INTRAVENOUS; SOFT TISSUE PRN
Status: DISCONTINUED | OUTPATIENT
Start: 2025-09-04 | End: 2025-09-04

## 2025-09-04 RX ORDER — SODIUM CHLORIDE, SODIUM LACTATE, POTASSIUM CHLORIDE, CALCIUM CHLORIDE 600; 310; 30; 20 MG/100ML; MG/100ML; MG/100ML; MG/100ML
INJECTION, SOLUTION INTRAVENOUS CONTINUOUS
Status: DISCONTINUED | OUTPATIENT
Start: 2025-09-04 | End: 2025-09-04 | Stop reason: HOSPADM

## 2025-09-04 RX ORDER — ACETAMINOPHEN 500 MG
500-1000 TABLET ORAL EVERY 6 HOURS PRN
Status: DISPENSED | OUTPATIENT
Start: 2025-09-04

## 2025-09-04 RX ORDER — ONDANSETRON 4 MG/1
4 TABLET, ORALLY DISINTEGRATING ORAL EVERY 6 HOURS PRN
Status: ACTIVE | OUTPATIENT
Start: 2025-09-04

## 2025-09-04 RX ORDER — ALBUTEROL SULFATE 90 UG/1
2 INHALANT RESPIRATORY (INHALATION) EVERY 4 HOURS PRN
Status: ACTIVE | OUTPATIENT
Start: 2025-09-04

## 2025-09-04 RX ORDER — FENTANYL CITRATE 0.05 MG/ML
50 INJECTION, SOLUTION INTRAMUSCULAR; INTRAVENOUS EVERY 5 MIN PRN
Status: DISCONTINUED | OUTPATIENT
Start: 2025-09-04 | End: 2025-09-04 | Stop reason: HOSPADM

## 2025-09-04 RX ORDER — MAGNESIUM HYDROXIDE 1200 MG/15ML
LIQUID ORAL PRN
Status: DISCONTINUED | OUTPATIENT
Start: 2025-09-04 | End: 2025-09-04 | Stop reason: HOSPADM

## 2025-09-04 RX ORDER — THERA TABS 400 MCG
1 TAB ORAL EVERY MORNING
Status: ACTIVE | OUTPATIENT
Start: 2025-09-05

## 2025-09-04 RX ORDER — ONDANSETRON 2 MG/ML
4 INJECTION INTRAMUSCULAR; INTRAVENOUS EVERY 30 MIN PRN
Status: DISCONTINUED | OUTPATIENT
Start: 2025-09-04 | End: 2025-09-04 | Stop reason: HOSPADM

## 2025-09-04 RX ORDER — PROCHLORPERAZINE MALEATE 5 MG/1
5 TABLET ORAL EVERY 6 HOURS PRN
Status: ACTIVE | OUTPATIENT
Start: 2025-09-04

## 2025-09-04 RX ORDER — BISACODYL 10 MG
10 SUPPOSITORY, RECTAL RECTAL DAILY PRN
Status: ACTIVE | OUTPATIENT
Start: 2025-09-07

## 2025-09-04 RX ORDER — VITAMIN B COMPLEX
25 TABLET ORAL AT BEDTIME
Status: DISPENSED | OUTPATIENT
Start: 2025-09-04

## 2025-09-04 RX ORDER — DIPHENHYDRAMINE HCL 25 MG
25 CAPSULE ORAL
Status: ACTIVE | OUTPATIENT
Start: 2025-09-04

## 2025-09-04 RX ORDER — LEVOTHYROXINE SODIUM 25 UG/1
25 TABLET ORAL
Status: ACTIVE | OUTPATIENT
Start: 2025-09-05

## 2025-09-04 RX ORDER — SCOPOLAMINE 1 MG/3D
1 PATCH, EXTENDED RELEASE TRANSDERMAL ONCE
Status: ACTIVE | OUTPATIENT
Start: 2025-09-04

## 2025-09-04 RX ORDER — MINERAL OIL AND WHITE PETROLATUM 150; 830 MG/G; MG/G
1 OINTMENT OPHTHALMIC
Status: DISPENSED | OUTPATIENT
Start: 2025-09-04

## 2025-09-04 RX ORDER — PROPOFOL 10 MG/ML
INJECTION, EMULSION INTRAVENOUS PRN
Status: DISCONTINUED | OUTPATIENT
Start: 2025-09-04 | End: 2025-09-04

## 2025-09-04 RX ORDER — CEFAZOLIN SODIUM/WATER 2 G/20 ML
2 SYRINGE (ML) INTRAVENOUS
Status: COMPLETED | OUTPATIENT
Start: 2025-09-04 | End: 2025-09-04

## 2025-09-04 RX ORDER — SODIUM CHLORIDE, SODIUM LACTATE, POTASSIUM CHLORIDE, CALCIUM CHLORIDE 600; 310; 30; 20 MG/100ML; MG/100ML; MG/100ML; MG/100ML
INJECTION, SOLUTION INTRAVENOUS CONTINUOUS
Status: ACTIVE | OUTPATIENT
Start: 2025-09-04

## 2025-09-04 RX ORDER — AMOXICILLIN 250 MG
1-2 CAPSULE ORAL 2 TIMES DAILY
Qty: 30 TABLET | Refills: 0 | Status: SHIPPED | OUTPATIENT
Start: 2025-09-04

## 2025-09-04 RX ORDER — POLYETHYLENE GLYCOL 3350 17 G/17G
17 POWDER, FOR SOLUTION ORAL DAILY
Status: ACTIVE | OUTPATIENT
Start: 2025-09-05

## 2025-09-04 RX ORDER — SODIUM CHLORIDE, SODIUM LACTATE, POTASSIUM CHLORIDE, CALCIUM CHLORIDE 600; 310; 30; 20 MG/100ML; MG/100ML; MG/100ML; MG/100ML
INJECTION, SOLUTION INTRAVENOUS CONTINUOUS PRN
Status: DISCONTINUED | OUTPATIENT
Start: 2025-09-04 | End: 2025-09-04

## 2025-09-04 RX ORDER — PROPOFOL 10 MG/ML
INJECTION, EMULSION INTRAVENOUS CONTINUOUS PRN
Status: DISCONTINUED | OUTPATIENT
Start: 2025-09-04 | End: 2025-09-04

## 2025-09-04 RX ADMIN — CALCIUM POLYCARBOPHIL 1250 MG: 625 TABLET, FILM COATED ORAL at 21:21

## 2025-09-04 RX ADMIN — CYCLOBENZAPRINE 10 MG: 10 TABLET, FILM COATED ORAL at 21:21

## 2025-09-04 RX ADMIN — ONDANSETRON 4 MG: 2 INJECTION INTRAMUSCULAR; INTRAVENOUS at 09:47

## 2025-09-04 RX ADMIN — PHENYLEPHRINE HYDROCHLORIDE 100 MCG: 10 INJECTION INTRAVENOUS at 08:38

## 2025-09-04 RX ADMIN — LIDOCAINE HYDROCHLORIDE 100 MG: 20 INJECTION, SOLUTION INFILTRATION; PERINEURAL at 07:39

## 2025-09-04 RX ADMIN — B-COMPLEX W/ C & FOLIC ACID TAB 1 TABLET: TAB at 15:26

## 2025-09-04 RX ADMIN — CEFAZOLIN SODIUM 2 G: 2 SOLUTION INTRAVENOUS at 23:50

## 2025-09-04 RX ADMIN — Medication 2 G: at 07:40

## 2025-09-04 RX ADMIN — FENTANYL CITRATE 50 MCG: 50 INJECTION, SOLUTION INTRAMUSCULAR; INTRAVENOUS at 07:39

## 2025-09-04 RX ADMIN — PROPOFOL 50 MCG/KG/MIN: 10 INJECTION, EMULSION INTRAVENOUS at 07:43

## 2025-09-04 RX ADMIN — SODIUM CHLORIDE, SODIUM LACTATE, POTASSIUM CHLORIDE, AND CALCIUM CHLORIDE: .6; .31; .03; .02 INJECTION, SOLUTION INTRAVENOUS at 09:47

## 2025-09-04 RX ADMIN — ROCURONIUM BROMIDE 50 MG: 50 INJECTION, SOLUTION INTRAVENOUS at 07:39

## 2025-09-04 RX ADMIN — ROCURONIUM BROMIDE 20 MG: 50 INJECTION, SOLUTION INTRAVENOUS at 08:19

## 2025-09-04 RX ADMIN — FENTANYL CITRATE 50 MCG: 50 INJECTION, SOLUTION INTRAMUSCULAR; INTRAVENOUS at 09:00

## 2025-09-04 RX ADMIN — METOPROLOL TARTRATE 12.5 MG: 25 TABLET, FILM COATED ORAL at 21:21

## 2025-09-04 RX ADMIN — NAPROXEN 250 MG: 250 TABLET ORAL at 18:04

## 2025-09-04 RX ADMIN — Medication 600 MG: at 21:21

## 2025-09-04 RX ADMIN — ROCURONIUM BROMIDE 10 MG: 50 INJECTION, SOLUTION INTRAVENOUS at 09:22

## 2025-09-04 RX ADMIN — FLUOXETINE HYDROCHLORIDE 20 MG: 20 CAPSULE ORAL at 15:25

## 2025-09-04 RX ADMIN — FERROUS SULFATE TAB 325 MG (65 MG ELEMENTAL FE) 325 MG: 325 (65 FE) TAB at 16:47

## 2025-09-04 RX ADMIN — DEXMEDETOMIDINE HYDROCHLORIDE 12 MCG: 100 INJECTION, SOLUTION INTRAVENOUS at 08:26

## 2025-09-04 RX ADMIN — PROPOFOL 50 MG: 10 INJECTION, EMULSION INTRAVENOUS at 10:07

## 2025-09-04 RX ADMIN — CEFAZOLIN SODIUM 2 G: 2 SOLUTION INTRAVENOUS at 15:33

## 2025-09-04 RX ADMIN — GLYCOPYRROLATE 0.2 MG: 0.2 INJECTION, SOLUTION INTRAMUSCULAR; INTRAVENOUS at 08:24

## 2025-09-04 RX ADMIN — SENNOSIDES, DOCUSATE SODIUM 1 TABLET: 50; 8.6 TABLET, FILM COATED ORAL at 21:22

## 2025-09-04 RX ADMIN — Medication 200 MG: at 10:12

## 2025-09-04 RX ADMIN — SODIUM CHLORIDE, SODIUM LACTATE, POTASSIUM CHLORIDE, AND CALCIUM CHLORIDE: .6; .31; .03; .02 INJECTION, SOLUTION INTRAVENOUS at 07:31

## 2025-09-04 RX ADMIN — MINERAL OIL, WHITE PETROLATUM 1 G: .03; .94 OINTMENT OPHTHALMIC at 21:31

## 2025-09-04 RX ADMIN — FLUOXETINE HYDROCHLORIDE 40 MG: 20 CAPSULE ORAL at 15:26

## 2025-09-04 RX ADMIN — SCOPOLAMINE 1 PATCH: 1.5 PATCH, EXTENDED RELEASE TRANSDERMAL at 07:07

## 2025-09-04 RX ADMIN — SODIUM CHLORIDE, SODIUM LACTATE, POTASSIUM CHLORIDE, AND CALCIUM CHLORIDE: .6; .31; .03; .02 INJECTION, SOLUTION INTRAVENOUS at 20:59

## 2025-09-04 RX ADMIN — OXYCODONE HYDROCHLORIDE AND ACETAMINOPHEN 1000 MG: 500 TABLET ORAL at 21:22

## 2025-09-04 RX ADMIN — Medication 25 MCG: at 21:21

## 2025-09-04 RX ADMIN — ATORVASTATIN CALCIUM 40 MG: 40 TABLET, FILM COATED ORAL at 21:22

## 2025-09-04 RX ADMIN — PROPOFOL 130 MG: 10 INJECTION, EMULSION INTRAVENOUS at 07:39

## 2025-09-04 RX ADMIN — PHENYLEPHRINE HYDROCHLORIDE 0.4 MCG/KG/MIN: 10 INJECTION INTRAVENOUS at 08:13

## 2025-09-04 RX ADMIN — OMEPRAZOLE 20 MG: 20 CAPSULE, DELAYED RELEASE ORAL at 21:22

## 2025-09-04 RX ADMIN — FENTANYL CITRATE 50 MCG: 50 INJECTION, SOLUTION INTRAMUSCULAR; INTRAVENOUS at 09:11

## 2025-09-04 RX ADMIN — ACETAMINOPHEN 1000 MG: 500 TABLET, FILM COATED ORAL at 16:47

## 2025-09-04 RX ADMIN — FENTANYL CITRATE 50 MCG: 50 INJECTION, SOLUTION INTRAMUSCULAR; INTRAVENOUS at 08:19

## 2025-09-04 RX ADMIN — DEXAMETHASONE SODIUM PHOSPHATE 4 MG: 4 INJECTION, SOLUTION INTRA-ARTICULAR; INTRALESIONAL; INTRAMUSCULAR; INTRAVENOUS; SOFT TISSUE at 07:58

## 2025-09-04 RX ADMIN — MONTELUKAST 10 MG: 10 TABLET, FILM COATED ORAL at 21:22

## 2025-09-04 RX ADMIN — Medication 25 MG: at 16:47

## 2025-09-04 RX ADMIN — ASPIRIN 81 MG CHEWABLE TABLET 81 MG: 81 TABLET CHEWABLE at 18:06

## 2025-09-04 RX ADMIN — CETIRIZINE HYDROCHLORIDE 10 MG: 10 TABLET, FILM COATED ORAL at 15:26

## 2025-09-04 RX ADMIN — DEXMEDETOMIDINE HYDROCHLORIDE 8 MCG: 100 INJECTION, SOLUTION INTRAVENOUS at 09:12

## 2025-09-04 ASSESSMENT — ACTIVITIES OF DAILY LIVING (ADL)
ADLS_ACUITY_SCORE: 36
ADLS_ACUITY_SCORE: 36
ADLS_ACUITY_SCORE: 38
ADLS_ACUITY_SCORE: 36
ADLS_ACUITY_SCORE: 53
ADLS_ACUITY_SCORE: 36
ADLS_ACUITY_SCORE: 36
ADLS_ACUITY_SCORE: 38
ADLS_ACUITY_SCORE: 36
ADLS_ACUITY_SCORE: 38
ADLS_ACUITY_SCORE: 38
ADLS_ACUITY_SCORE: 36
ADLS_ACUITY_SCORE: 36
ADLS_ACUITY_SCORE: 38

## 2025-09-04 ASSESSMENT — LIFESTYLE VARIABLES: TOBACCO_USE: 1

## (undated) DEVICE — BLADE SAW SAGITTAL STRK XSHORT 25X9.5X0.6MM 2108-145-000

## (undated) DEVICE — BLADE SAW SAGITTAL STRK 18X90X1.19MM HD SYS 6 6118-119-090

## (undated) DEVICE — ESU GROUND PAD UNIVERSAL W/O CORD

## (undated) DEVICE — MANIFOLD NEPTUNE 4 PORT 700-20

## (undated) DEVICE — CATH FOLEY 16FR 30ML LATEX 0166SI16

## (undated) DEVICE — BONE CLEANING TIP INTERPULSE  0210-010-000

## (undated) DEVICE — BLADE KNIFE SURG 10 371110

## (undated) DEVICE — SU DERMABOND PRINEO 22CM CLR222US

## (undated) DEVICE — SUCTION IRR SYSTEM W/O TIP INTERPULSE HANDPIECE 0210-100-000

## (undated) DEVICE — GLOVE BIOGEL PI ULTRATOUCH SZ 8.0 41180

## (undated) DEVICE — IMM KNEE 24" 0814-2664

## (undated) DEVICE — CAST PADDING 6" UNSTERILE 9046

## (undated) DEVICE — SU MONOCRYL 3-0 PS-2 27" Y427H

## (undated) DEVICE — BLADE SAW RECIP STRK 70X12.5X1.2MM 0277-096-281

## (undated) DEVICE — SOL NACL 0.9% IRRIG 1000ML BOTTLE 2F7124

## (undated) DEVICE — NDL SPINAL 18GA 3.5" 405184

## (undated) DEVICE — DRSG ABDOMINAL 07 1/2X8" 7197D

## (undated) DEVICE — DRSG GAUZE 4X4" 3033

## (undated) DEVICE — SU VICRYL 2-0 CP-1 27" J466H

## (undated) DEVICE — SOL WATER IRRIG 1000ML BOTTLE 2F7114

## (undated) DEVICE — SU ETHIBOND 0 CTX CR  8X18" CX31D

## (undated) DEVICE — SU VICRYL 0 CP-1 27" J467H

## (undated) DEVICE — DRAPE STERI TOWEL LG 1010

## (undated) DEVICE — CLOSURE SYS SKIN PREMIERPRO EXOFIN FUSION 4X22CM STRL 3472

## (undated) DEVICE — DRAPE MAYO STAND 23X54 8337

## (undated) DEVICE — NDL COUNTER 40CT  31142311

## (undated) DEVICE — GLOVE BIOGEL PI MICRO INDICATOR UNDERGLOVE SZ 8.0 48980

## (undated) DEVICE — CAST PADDING 6" STERILE 9046S

## (undated) DEVICE — IMM KNEE 20" 0814-2660

## (undated) DEVICE — WRAP EZY KNEE 1213PP

## (undated) DEVICE — SU VICRYL 0 CT-1 CR 8X27" UND JJ41G

## (undated) DEVICE — PACK TOTAL KNEE SOP15TKFSD

## (undated) DEVICE — LINEN TOWEL PACK X5 5464

## (undated) DEVICE — CAST PADDING 4" COTTON WEBRIL UNSTERILE 9084

## (undated) DEVICE — DECANTER BAG 2002S

## (undated) DEVICE — SOLUTION WOUND CLEANSING 3/4OZ 10% PVP EA-L3011FB-50

## (undated) DEVICE — SYR 30ML LL W/O NDL 302832

## (undated) DEVICE — DRAPE IOBAN INCISE 23X17" 6650EZ

## (undated) DEVICE — SU STRATAFIX PDS PLUS 0 CT-1 18" SXPP1A401

## (undated) RX ORDER — DEXAMETHASONE SODIUM PHOSPHATE 4 MG/ML
INJECTION, SOLUTION INTRA-ARTICULAR; INTRALESIONAL; INTRAMUSCULAR; INTRAVENOUS; SOFT TISSUE
Status: DISPENSED
Start: 2025-09-04

## (undated) RX ORDER — VECURONIUM BROMIDE 1 MG/ML
INJECTION, POWDER, LYOPHILIZED, FOR SOLUTION INTRAVENOUS
Status: DISPENSED
Start: 2024-03-07

## (undated) RX ORDER — ONDANSETRON 2 MG/ML
INJECTION INTRAMUSCULAR; INTRAVENOUS
Status: DISPENSED
Start: 2024-03-07

## (undated) RX ORDER — FENTANYL CITRATE 50 UG/ML
INJECTION, SOLUTION INTRAMUSCULAR; INTRAVENOUS
Status: DISPENSED
Start: 2025-09-04

## (undated) RX ORDER — ONDANSETRON 2 MG/ML
INJECTION INTRAMUSCULAR; INTRAVENOUS
Status: DISPENSED
Start: 2025-09-04

## (undated) RX ORDER — SCOLOPAMINE TRANSDERMAL SYSTEM 1 MG/1
PATCH, EXTENDED RELEASE TRANSDERMAL
Status: DISPENSED
Start: 2024-03-07

## (undated) RX ORDER — VANCOMYCIN HYDROCHLORIDE 1 G/20ML
INJECTION, POWDER, LYOPHILIZED, FOR SOLUTION INTRAVENOUS
Status: DISPENSED
Start: 2025-09-04

## (undated) RX ORDER — SCOPOLAMINE 1 MG/3D
PATCH, EXTENDED RELEASE TRANSDERMAL
Status: DISPENSED
Start: 2025-09-04

## (undated) RX ORDER — FENTANYL CITRATE 50 UG/ML
INJECTION, SOLUTION INTRAMUSCULAR; INTRAVENOUS
Status: DISPENSED
Start: 2024-03-07

## (undated) RX ORDER — GLYCOPYRROLATE 0.2 MG/ML
INJECTION, SOLUTION INTRAMUSCULAR; INTRAVENOUS
Status: DISPENSED
Start: 2025-09-04

## (undated) RX ORDER — CEFAZOLIN SODIUM 1 G/3ML
INJECTION, POWDER, FOR SOLUTION INTRAMUSCULAR; INTRAVENOUS
Status: DISPENSED
Start: 2024-03-07

## (undated) RX ORDER — PROPOFOL 10 MG/ML
INJECTION, EMULSION INTRAVENOUS
Status: DISPENSED
Start: 2025-09-04

## (undated) RX ORDER — VANCOMYCIN HYDROCHLORIDE 1 G/20ML
INJECTION, POWDER, LYOPHILIZED, FOR SOLUTION INTRAVENOUS
Status: DISPENSED
Start: 2024-03-07

## (undated) RX ORDER — FENTANYL CITRATE 0.05 MG/ML
INJECTION, SOLUTION INTRAMUSCULAR; INTRAVENOUS
Status: DISPENSED
Start: 2024-03-07

## (undated) RX ORDER — DEXAMETHASONE SODIUM PHOSPHATE 4 MG/ML
INJECTION, SOLUTION INTRA-ARTICULAR; INTRALESIONAL; INTRAMUSCULAR; INTRAVENOUS; SOFT TISSUE
Status: DISPENSED
Start: 2024-03-07

## (undated) RX ORDER — PROPOFOL 10 MG/ML
INJECTION, EMULSION INTRAVENOUS
Status: DISPENSED
Start: 2024-03-07

## (undated) RX ORDER — TRANEXAMIC ACID 650 MG/1
TABLET ORAL
Status: DISPENSED
Start: 2024-03-07

## (undated) RX ORDER — CEFAZOLIN SODIUM/WATER 2 G/20 ML
SYRINGE (ML) INTRAVENOUS
Status: DISPENSED
Start: 2024-03-07

## (undated) RX ORDER — CEFAZOLIN SODIUM/WATER 2 G/20 ML
SYRINGE (ML) INTRAVENOUS
Status: DISPENSED
Start: 2025-09-04